# Patient Record
Sex: FEMALE | Race: BLACK OR AFRICAN AMERICAN | NOT HISPANIC OR LATINO
[De-identification: names, ages, dates, MRNs, and addresses within clinical notes are randomized per-mention and may not be internally consistent; named-entity substitution may affect disease eponyms.]

---

## 2024-08-26 ENCOUNTER — APPOINTMENT (OUTPATIENT)
Dept: FAMILY MEDICINE | Facility: CLINIC | Age: 29
End: 2024-08-26
Payer: COMMERCIAL

## 2024-08-26 ENCOUNTER — NON-APPOINTMENT (OUTPATIENT)
Age: 29
End: 2024-08-26

## 2024-08-26 ENCOUNTER — RESULT CHARGE (OUTPATIENT)
Age: 29
End: 2024-08-26

## 2024-08-26 VITALS
HEART RATE: 82 BPM | WEIGHT: 85 LBS | OXYGEN SATURATION: 99 % | BODY MASS INDEX: 16.91 KG/M2 | HEIGHT: 59.5 IN | SYSTOLIC BLOOD PRESSURE: 82 MMHG | DIASTOLIC BLOOD PRESSURE: 58 MMHG | TEMPERATURE: 98.7 F

## 2024-08-26 DIAGNOSIS — Z00.00 ENCOUNTER FOR GENERAL ADULT MEDICAL EXAMINATION W/OUT ABNORMAL FINDINGS: ICD-10-CM

## 2024-08-26 DIAGNOSIS — Z34.90 ENCOUNTER FOR SUPERVISION OF NORMAL PREGNANCY, UNSPECIFIED, UNSPECIFIED TRIMESTER: ICD-10-CM

## 2024-08-26 LAB
BILIRUB UR QL STRIP: NEGATIVE
CLARITY UR: CLEAR
COLLECTION METHOD: NORMAL
GLUCOSE UR-MCNC: NEGATIVE
HCG UR QL: 2 EU/DL
HGB UR QL STRIP.AUTO: NEGATIVE
KETONES UR-MCNC: NORMAL
LEUKOCYTE ESTERASE UR QL STRIP: NEGATIVE
NITRITE UR QL STRIP: NEGATIVE
PH UR STRIP: 6
PROT UR STRIP-MCNC: NEGATIVE
SP GR UR STRIP: 1.02

## 2024-08-26 PROCEDURE — 92552 PURE TONE AUDIOMETRY AIR: CPT

## 2024-08-26 PROCEDURE — 36415 COLL VENOUS BLD VENIPUNCTURE: CPT

## 2024-08-26 PROCEDURE — 99385 PREV VISIT NEW AGE 18-39: CPT

## 2024-08-26 PROCEDURE — 99173 VISUAL ACUITY SCREEN: CPT

## 2024-08-26 PROCEDURE — 81003 URINALYSIS AUTO W/O SCOPE: CPT | Mod: QW

## 2024-08-26 NOTE — HEALTH RISK ASSESSMENT
[1 or 2 (0 pts)] : 1 or 2 (0 points) [Never (0 pts)] : Never (0 points) [No] : In the past 12 months have you used drugs other than those required for medical reasons? No [No falls in past year] : Patient reported no falls in the past year [0] : 2) Feeling down, depressed, or hopeless: Not at all (0) [PHQ-2 Negative - No further assessment needed] : PHQ-2 Negative - No further assessment needed [Patient reported PAP Smear was normal] : Patient reported PAP Smear was normal [HIV Test offered] : HIV Test offered [Hepatitis C test offered] : Hepatitis C test offered [With Family] : lives with family [# of Members in Household ___] :  household currently consist of [unfilled] member(s) [Employed] : employed [High School] : high school [] :  [Sexually Active] : sexually active [Feels Safe at Home] : Feels safe at home [Fully functional (bathing, dressing, toileting, transferring, walking, feeding)] : Fully functional (bathing, dressing, toileting, transferring, walking, feeding) [Fully functional (using the telephone, shopping, preparing meals, housekeeping, doing laundry, using] : Fully functional and needs no help or supervision to perform IADLs (using the telephone, shopping, preparing meals, housekeeping, doing laundry, using transportation, managing medications and managing finances) [Reports normal functional visual acuity (ie: able to read med bottle)] : Reports normal functional visual acuity [Smoke Detector] : smoke detector [Carbon Monoxide Detector] : carbon monoxide detector [Seat Belt] :  uses seat belt [Never] : Never [NO] : No [Audit-CScore] : 0 [[id number]] : 0 [Change in mental status noted] : No change in mental status noted [Language] : denies difficulty with language [Behavior] : denies difficulty with behavior [Learning/Retaining New Information] : denies difficulty learning/retaining new information [Handling Complex Tasks] : denies difficulty handling complex tasks [Reasoning] : denies difficulty with reasoning [Spatial Ability and Orientation] : denies difficulty with spatial ability and orientation [Reports changes in hearing] : Reports no changes in hearing [Reports changes in vision] : Reports no changes in vision [Reports changes in dental health] : Reports no changes in dental health [Sunscreen] : does not use sunscreen [Travel to Developing Areas] : does not  travel to developing areas [TB Exposure] : is not being exposed to tuberculosis [PapSmearDate] : 01/18

## 2024-08-26 NOTE — PHYSICAL EXAM

## 2024-08-26 NOTE — HISTORY OF PRESENT ILLNESS
[FreeTextEntry1] : New pt is here for CPE. at home Pregnancy test positive  08/05/2024  LMP 07/01/2024 [de-identified] : States has been losing weight due to nausea since pregnant  cant cook bc of smells

## 2024-08-28 ENCOUNTER — NON-APPOINTMENT (OUTPATIENT)
Age: 29
End: 2024-08-28

## 2024-08-28 ENCOUNTER — TRANSCRIPTION ENCOUNTER (OUTPATIENT)
Age: 29
End: 2024-08-28

## 2024-08-28 DIAGNOSIS — E05.90 THYROTOXICOSIS, UNSPECIFIED W/OUT THYROTOXIC CRISIS OR STORM: ICD-10-CM

## 2024-08-28 LAB
25(OH)D3 SERPL-MCNC: 89.6 NG/ML
ALBUMIN SERPL ELPH-MCNC: 4.3 G/DL
ALP BLD-CCNC: 50 U/L
ALT SERPL-CCNC: 10 U/L
ANION GAP SERPL CALC-SCNC: 10 MMOL/L
APPEARANCE: CLEAR
AST SERPL-CCNC: 13 U/L
BACTERIA: NEGATIVE /HPF
BILIRUB SERPL-MCNC: 0.5 MG/DL
BILIRUBIN URINE: NEGATIVE
BLOOD URINE: NEGATIVE
BUN SERPL-MCNC: 5 MG/DL
CALCIUM SERPL-MCNC: 10 MG/DL
CAST: 1 /LPF
CHLORIDE SERPL-SCNC: 105 MMOL/L
CHOLEST SERPL-MCNC: 126 MG/DL
CO2 SERPL-SCNC: 22 MMOL/L
COLOR: NORMAL
CREAT SERPL-MCNC: 0.66 MG/DL
EGFR: 121 ML/MIN/1.73M2
EPITHELIAL CELLS: 8 /HPF
ESTIMATED AVERAGE GLUCOSE: 91 MG/DL
GLUCOSE QUALITATIVE U: NEGATIVE MG/DL
GLUCOSE SERPL-MCNC: 90 MG/DL
HBA1C MFR BLD HPLC: 4.8 %
HCG SERPL-MCNC: ABNORMAL MIU/ML
HCT VFR BLD CALC: 37.9 %
HCV AB SER QL: NONREACTIVE
HCV S/CO RATIO: 0.06 S/CO
HDLC SERPL-MCNC: 63 MG/DL
HGB BLD-MCNC: 12.8 G/DL
HIV1+2 AB SPEC QL IA.RAPID: NONREACTIVE
KETONES URINE: 40 MG/DL
LDLC SERPL CALC-MCNC: 50 MG/DL
LEUKOCYTE ESTERASE URINE: NEGATIVE
MCHC RBC-ENTMCNC: 29.1 PG
MCHC RBC-ENTMCNC: 33.8 GM/DL
MCV RBC AUTO: 86.1 FL
MICROSCOPIC-UA: NORMAL
MUCUS: PRESENT
NITRITE URINE: NEGATIVE
NONHDLC SERPL-MCNC: 63 MG/DL
PH URINE: 6
PLATELET # BLD AUTO: 257 K/UL
POTASSIUM SERPL-SCNC: 4.1 MMOL/L
PROT SERPL-MCNC: 6.7 G/DL
PROTEIN URINE: NORMAL MG/DL
RBC # BLD: 4.4 M/UL
RBC # FLD: 12.4 %
RED BLOOD CELLS URINE: 2 /HPF
REVIEW: NORMAL
SODIUM SERPL-SCNC: 138 MMOL/L
SPECIFIC GRAVITY URINE: 1.02
T3FREE SERPL-MCNC: 5.29 PG/ML
T4 FREE SERPL-MCNC: 2.3 NG/DL
TRIGL SERPL-MCNC: 62 MG/DL
TSH SERPL-ACNC: 0.01 UIU/ML
UROBILINOGEN URINE: 1 MG/DL
WBC # FLD AUTO: 5.33 K/UL
WHITE BLOOD CELLS URINE: 0 /HPF

## 2024-09-04 ENCOUNTER — APPOINTMENT (OUTPATIENT)
Dept: MATERNAL FETAL MEDICINE | Facility: CLINIC | Age: 29
End: 2024-09-04

## 2024-09-05 ENCOUNTER — APPOINTMENT (OUTPATIENT)
Dept: MATERNAL FETAL MEDICINE | Facility: CLINIC | Age: 29
End: 2024-09-05
Payer: COMMERCIAL

## 2024-09-05 ENCOUNTER — APPOINTMENT (OUTPATIENT)
Dept: ANTEPARTUM | Facility: CLINIC | Age: 29
End: 2024-09-05
Payer: COMMERCIAL

## 2024-09-05 ENCOUNTER — ASOB RESULT (OUTPATIENT)
Age: 29
End: 2024-09-05

## 2024-09-05 VITALS
SYSTOLIC BLOOD PRESSURE: 104 MMHG | HEIGHT: 59 IN | RESPIRATION RATE: 18 BRPM | DIASTOLIC BLOOD PRESSURE: 60 MMHG | OXYGEN SATURATION: 99 % | HEART RATE: 89 BPM | BODY MASS INDEX: 17.14 KG/M2 | WEIGHT: 85 LBS

## 2024-09-05 DIAGNOSIS — Z78.9 OTHER SPECIFIED HEALTH STATUS: ICD-10-CM

## 2024-09-05 DIAGNOSIS — Z3A.09 9 WEEKS GESTATION OF PREGNANCY: ICD-10-CM

## 2024-09-05 DIAGNOSIS — E05.90 ENDOCRINE, NUTRITIONAL AND METABOLIC DISEASES COMPLICATING PREGNANCY, UNSPECIFIED TRIMESTER: ICD-10-CM

## 2024-09-05 DIAGNOSIS — O99.280 ENDOCRINE, NUTRITIONAL AND METABOLIC DISEASES COMPLICATING PREGNANCY, UNSPECIFIED TRIMESTER: ICD-10-CM

## 2024-09-05 PROCEDURE — 76801 OB US < 14 WKS SINGLE FETUS: CPT

## 2024-09-05 PROCEDURE — 99204 OFFICE O/P NEW MOD 45 MIN: CPT

## 2024-09-05 RX ORDER — PNV NO.95/FERROUS FUM/FOLIC AC 28MG-0.8MG
TABLET ORAL
Refills: 0 | Status: ACTIVE | COMMUNITY

## 2024-09-05 NOTE — DATA REVIEWED
[FreeTextEntry1] : Fetal viability was confirmed.   We reviewed her TSH and free tT3/T4.  A consultation with endocrinology was seen last week, and a followup labs and evaluation for medical therapy was held until that meeting in 2 weeks. At this time, we will defer for that plan  I did discuss the symptoms associated with hyperthyroid, and given her lack of goiter or palpable nodules, the liklihood for a successful correction with medication. Her main concern is weight loss, especially since she is underweight to start. I stressed the importance of hydration, and what seems to be her symptoims more consistent with fist trimester hyperemesis rather than hyperthyroidism weight loss.   We will continue to monitor as she leaves the first trimester.

## 2024-09-05 NOTE — DISCUSSION/SUMMARY
[FreeTextEntry1] : Followup with endocrinology is scheduled.   Repeat consultation as clinically indicated.

## 2024-09-09 ENCOUNTER — ASOB RESULT (OUTPATIENT)
Age: 29
End: 2024-09-09

## 2024-09-09 ENCOUNTER — APPOINTMENT (OUTPATIENT)
Dept: MATERNAL FETAL MEDICINE | Facility: CLINIC | Age: 29
End: 2024-09-09
Payer: COMMERCIAL

## 2024-09-09 PROCEDURE — 99212 OFFICE O/P EST SF 10 MIN: CPT | Mod: 95

## 2024-09-23 ENCOUNTER — LABORATORY RESULT (OUTPATIENT)
Age: 29
End: 2024-09-23

## 2024-09-24 ENCOUNTER — ASOB RESULT (OUTPATIENT)
Age: 29
End: 2024-09-24

## 2024-09-24 ENCOUNTER — APPOINTMENT (OUTPATIENT)
Dept: ANTEPARTUM | Facility: CLINIC | Age: 29
End: 2024-09-24
Payer: COMMERCIAL

## 2024-09-24 PROCEDURE — 36415 COLL VENOUS BLD VENIPUNCTURE: CPT

## 2024-09-24 PROCEDURE — 76813 OB US NUCHAL MEAS 1 GEST: CPT

## 2024-10-01 ENCOUNTER — NON-APPOINTMENT (OUTPATIENT)
Age: 29
End: 2024-10-01

## 2024-10-04 LAB
ADDITIONAL US: NORMAL
COMMENTS: AFP: NORMAL
CRL SCAN TWIN B: NORMAL
CRL SCAN: NORMAL
CROWN RUMP LENGTH TWIN B: NORMAL
CROWN RUMP LENGTH: 60.1 MM
DOWN SYNDROME AGE RISK: NORMAL
DOWN SYNDROME INTERPRETATION: NORMAL
DOWN SYNDROME SCREENING RISK: NORMAL
GEST. AGE ON COLLECTION DATE: 12.3 WEEKS
HCG MOM: 1.04
HCG VALUE: 154.7 IU/ML
MATERNAL AGE AT EDD: 30.9 YR
NOTE: AFP: NORMAL
NT MOM TWIN B: NORMAL
NT TWIN B: NORMAL
NUCHAL TRANSLUCENCY (NT): 1.4 MM
NUCHAL TRANSLUCENCY MOM: 0.9
NUMBER OF FETUSES: 1
PAPP-A MOM: 1.69
PAPP-A VALUE: 3037.7 NG/ML
RACE: NORMAL
RESULTS AFP: NORMAL
SONOGRAPHER ID#: NORMAL
SUBMIT PART 2 SAMPLE USING: NORMAL
TEST RESULTS: AFP: NORMAL
TRISOMY 18 AGE RISK: NORMAL
TRISOMY 18 INTERPRETATION: NORMAL
TRISOMY 18 SCREENING RISK: NORMAL
WEIGHT AFP: 83 LBS

## 2024-10-11 ENCOUNTER — EMERGENCY (EMERGENCY)
Facility: HOSPITAL | Age: 29
LOS: 0 days | Discharge: ANOTHER HOSPITAL | End: 2024-10-11
Attending: EMERGENCY MEDICINE
Payer: COMMERCIAL

## 2024-10-11 ENCOUNTER — EMERGENCY (EMERGENCY)
Facility: HOSPITAL | Age: 29
LOS: 1 days | Discharge: HOME | End: 2024-10-11
Attending: STUDENT IN AN ORGANIZED HEALTH CARE EDUCATION/TRAINING PROGRAM
Payer: COMMERCIAL

## 2024-10-11 ENCOUNTER — APPOINTMENT (OUTPATIENT)
Dept: FAMILY MEDICINE | Facility: CLINIC | Age: 29
End: 2024-10-11

## 2024-10-11 VITALS — WEIGHT: 83.33 LBS | HEIGHT: 60 IN

## 2024-10-11 VITALS
OXYGEN SATURATION: 95 % | RESPIRATION RATE: 16 BRPM | DIASTOLIC BLOOD PRESSURE: 57 MMHG | TEMPERATURE: 100 F | SYSTOLIC BLOOD PRESSURE: 94 MMHG | HEIGHT: 60 IN | WEIGHT: 115.08 LBS | HEART RATE: 109 BPM

## 2024-10-11 VITALS
OXYGEN SATURATION: 100 % | DIASTOLIC BLOOD PRESSURE: 66 MMHG | HEART RATE: 105 BPM | RESPIRATION RATE: 18 BRPM | SYSTOLIC BLOOD PRESSURE: 97 MMHG | TEMPERATURE: 99 F

## 2024-10-11 DIAGNOSIS — J11.1 INFLUENZA DUE TO UNIDENTIFIED INFLUENZA VIRUS WITH OTHER RESPIRATORY MANIFESTATIONS: ICD-10-CM

## 2024-10-11 DIAGNOSIS — E05.90 THYROTOXICOSIS, UNSPECIFIED WITHOUT THYROTOXIC CRISIS OR STORM: ICD-10-CM

## 2024-10-11 DIAGNOSIS — R09.81 NASAL CONGESTION: ICD-10-CM

## 2024-10-11 DIAGNOSIS — R00.0 TACHYCARDIA, UNSPECIFIED: ICD-10-CM

## 2024-10-11 DIAGNOSIS — Z3A.14 14 WEEKS GESTATION OF PREGNANCY: ICD-10-CM

## 2024-10-11 DIAGNOSIS — R05.9 COUGH, UNSPECIFIED: ICD-10-CM

## 2024-10-11 DIAGNOSIS — O99.891 OTHER SPECIFIED DISEASES AND CONDITIONS COMPLICATING PREGNANCY: ICD-10-CM

## 2024-10-11 DIAGNOSIS — Z00.00 ENCOUNTER FOR GENERAL ADULT MEDICAL EXAMINATION WITHOUT ABNORMAL FINDINGS: ICD-10-CM

## 2024-10-11 DIAGNOSIS — O99.282 ENDOCRINE, NUTRITIONAL AND METABOLIC DISEASES COMPLICATING PREGNANCY, SECOND TRIMESTER: ICD-10-CM

## 2024-10-11 DIAGNOSIS — J02.9 ACUTE PHARYNGITIS, UNSPECIFIED: ICD-10-CM

## 2024-10-11 LAB
ADD ON TEST-SPECIMEN IN LAB: SIGNIFICANT CHANGE UP
ALBUMIN SERPL ELPH-MCNC: 3.1 G/DL — LOW (ref 3.3–5)
ALP SERPL-CCNC: 57 U/L — SIGNIFICANT CHANGE UP (ref 40–120)
ALT FLD-CCNC: 12 U/L — SIGNIFICANT CHANGE UP (ref 12–78)
ANION GAP SERPL CALC-SCNC: 8 MMOL/L — SIGNIFICANT CHANGE UP (ref 5–17)
APPEARANCE UR: CLEAR — SIGNIFICANT CHANGE UP
APTT BLD: 30.5 SEC — SIGNIFICANT CHANGE UP (ref 24.5–35.6)
AST SERPL-CCNC: 16 U/L — SIGNIFICANT CHANGE UP (ref 15–37)
BASOPHILS # BLD AUTO: 0.02 K/UL — SIGNIFICANT CHANGE UP (ref 0–0.2)
BASOPHILS NFR BLD AUTO: 0.2 % — SIGNIFICANT CHANGE UP (ref 0–2)
BILIRUB SERPL-MCNC: 0.6 MG/DL — SIGNIFICANT CHANGE UP (ref 0.2–1.2)
BILIRUB UR-MCNC: NEGATIVE — SIGNIFICANT CHANGE UP
BUN SERPL-MCNC: 2 MG/DL — LOW (ref 7–23)
CALCIUM SERPL-MCNC: 9.5 MG/DL — SIGNIFICANT CHANGE UP (ref 8.5–10.1)
CHLORIDE SERPL-SCNC: 106 MMOL/L — SIGNIFICANT CHANGE UP (ref 96–108)
CO2 SERPL-SCNC: 22 MMOL/L — SIGNIFICANT CHANGE UP (ref 22–31)
COLOR SPEC: YELLOW — SIGNIFICANT CHANGE UP
CREAT SERPL-MCNC: 0.65 MG/DL — SIGNIFICANT CHANGE UP (ref 0.5–1.3)
D DIMER BLD IA.RAPID-MCNC: 173 NG/ML DDU — SIGNIFICANT CHANGE UP
DIFF PNL FLD: NEGATIVE — SIGNIFICANT CHANGE UP
EGFR: 121 ML/MIN/1.73M2 — SIGNIFICANT CHANGE UP
EOSINOPHIL # BLD AUTO: 0.01 K/UL — SIGNIFICANT CHANGE UP (ref 0–0.5)
EOSINOPHIL NFR BLD AUTO: 0.1 % — SIGNIFICANT CHANGE UP (ref 0–6)
FLUAV AG NPH QL: SIGNIFICANT CHANGE UP
FLUBV AG NPH QL: SIGNIFICANT CHANGE UP
GLUCOSE SERPL-MCNC: 105 MG/DL — HIGH (ref 70–99)
GLUCOSE UR QL: NEGATIVE MG/DL — SIGNIFICANT CHANGE UP
HCG SERPL-ACNC: HIGH MIU/ML
HCT VFR BLD CALC: 36.4 % — SIGNIFICANT CHANGE UP (ref 34.5–45)
HGB BLD-MCNC: 12.2 G/DL — SIGNIFICANT CHANGE UP (ref 11.5–15.5)
IMM GRANULOCYTES NFR BLD AUTO: 0.7 % — SIGNIFICANT CHANGE UP (ref 0–0.9)
INR BLD: 0.93 RATIO — SIGNIFICANT CHANGE UP (ref 0.85–1.16)
KETONES UR-MCNC: 80 MG/DL
LACTATE SERPL-SCNC: 1.2 MMOL/L — SIGNIFICANT CHANGE UP (ref 0.7–2)
LEUKOCYTE ESTERASE UR-ACNC: NEGATIVE — SIGNIFICANT CHANGE UP
LYMPHOCYTES # BLD AUTO: 0.32 K/UL — LOW (ref 1–3.3)
LYMPHOCYTES # BLD AUTO: 3.6 % — LOW (ref 13–44)
MCHC RBC-ENTMCNC: 28.9 PG — SIGNIFICANT CHANGE UP (ref 27–34)
MCHC RBC-ENTMCNC: 33.5 GM/DL — SIGNIFICANT CHANGE UP (ref 32–36)
MCV RBC AUTO: 86.3 FL — SIGNIFICANT CHANGE UP (ref 80–100)
MONOCYTES # BLD AUTO: 0.62 K/UL — SIGNIFICANT CHANGE UP (ref 0–0.9)
MONOCYTES NFR BLD AUTO: 7 % — SIGNIFICANT CHANGE UP (ref 2–14)
NEUTROPHILS # BLD AUTO: 7.89 K/UL — HIGH (ref 1.8–7.4)
NEUTROPHILS NFR BLD AUTO: 88.4 % — HIGH (ref 43–77)
NITRITE UR-MCNC: NEGATIVE — SIGNIFICANT CHANGE UP
PH UR: 6 — SIGNIFICANT CHANGE UP (ref 5–8)
PLATELET # BLD AUTO: 245 K/UL — SIGNIFICANT CHANGE UP (ref 150–400)
POTASSIUM SERPL-MCNC: 3.6 MMOL/L — SIGNIFICANT CHANGE UP (ref 3.5–5.3)
POTASSIUM SERPL-SCNC: 3.6 MMOL/L — SIGNIFICANT CHANGE UP (ref 3.5–5.3)
PROT SERPL-MCNC: 7 GM/DL — SIGNIFICANT CHANGE UP (ref 6–8.3)
PROT UR-MCNC: NEGATIVE MG/DL — SIGNIFICANT CHANGE UP
PROTHROM AB SERPL-ACNC: 11 SEC — SIGNIFICANT CHANGE UP (ref 9.9–13.4)
RBC # BLD: 4.22 M/UL — SIGNIFICANT CHANGE UP (ref 3.8–5.2)
RBC # FLD: 12.5 % — SIGNIFICANT CHANGE UP (ref 10.3–14.5)
RSV RNA NPH QL NAA+NON-PROBE: SIGNIFICANT CHANGE UP
SARS-COV-2 RNA SPEC QL NAA+PROBE: SIGNIFICANT CHANGE UP
SODIUM SERPL-SCNC: 136 MMOL/L — SIGNIFICANT CHANGE UP (ref 135–145)
SP GR SPEC: 1.01 — SIGNIFICANT CHANGE UP (ref 1–1.03)
T3 SERPL-MCNC: 124 NG/DL — SIGNIFICANT CHANGE UP (ref 80–200)
T3 SERPL-MCNC: 137 NG/DL — SIGNIFICANT CHANGE UP (ref 80–200)
T4 AB SER-ACNC: 10.7 UG/DL — SIGNIFICANT CHANGE UP (ref 4.6–12)
T4 FREE SERPL-MCNC: 1.08 NG/DL — SIGNIFICANT CHANGE UP (ref 0.76–1.46)
TROPONIN I, HIGH SENSITIVITY RESULT: 3.96 NG/L — SIGNIFICANT CHANGE UP
TSH SERPL-MCNC: 0.02 UU/ML — LOW (ref 0.34–4.82)
TSH SERPL-MCNC: 0.02 UU/ML — LOW (ref 0.34–4.82)
UROBILINOGEN FLD QL: 0.2 MG/DL — SIGNIFICANT CHANGE UP (ref 0.2–1)
WBC # BLD: 8.92 K/UL — SIGNIFICANT CHANGE UP (ref 3.8–10.5)
WBC # FLD AUTO: 8.92 K/UL — SIGNIFICANT CHANGE UP (ref 3.8–10.5)

## 2024-10-11 PROCEDURE — 85730 THROMBOPLASTIN TIME PARTIAL: CPT

## 2024-10-11 PROCEDURE — 85379 FIBRIN DEGRADATION QUANT: CPT

## 2024-10-11 PROCEDURE — 81003 URINALYSIS AUTO W/O SCOPE: CPT

## 2024-10-11 PROCEDURE — 99285 EMERGENCY DEPT VISIT HI MDM: CPT | Mod: 25

## 2024-10-11 PROCEDURE — 84436 ASSAY OF TOTAL THYROXINE: CPT

## 2024-10-11 PROCEDURE — 87040 BLOOD CULTURE FOR BACTERIA: CPT

## 2024-10-11 PROCEDURE — 76815 OB US LIMITED FETUS(S): CPT | Mod: 26

## 2024-10-11 PROCEDURE — 84484 ASSAY OF TROPONIN QUANT: CPT

## 2024-10-11 PROCEDURE — 85025 COMPLETE CBC W/AUTO DIFF WBC: CPT

## 2024-10-11 PROCEDURE — 36000 PLACE NEEDLE IN VEIN: CPT

## 2024-10-11 PROCEDURE — 99215 OFFICE O/P EST HI 40 MIN: CPT | Mod: 95

## 2024-10-11 PROCEDURE — 93010 ELECTROCARDIOGRAM REPORT: CPT

## 2024-10-11 PROCEDURE — 71045 X-RAY EXAM CHEST 1 VIEW: CPT

## 2024-10-11 PROCEDURE — 71045 X-RAY EXAM CHEST 1 VIEW: CPT | Mod: 26

## 2024-10-11 PROCEDURE — 83605 ASSAY OF LACTIC ACID: CPT

## 2024-10-11 PROCEDURE — G2211 COMPLEX E/M VISIT ADD ON: CPT

## 2024-10-11 PROCEDURE — 76815 OB US LIMITED FETUS(S): CPT

## 2024-10-11 PROCEDURE — 0241U: CPT

## 2024-10-11 PROCEDURE — 93005 ELECTROCARDIOGRAM TRACING: CPT

## 2024-10-11 PROCEDURE — 36415 COLL VENOUS BLD VENIPUNCTURE: CPT

## 2024-10-11 PROCEDURE — 84480 ASSAY TRIIODOTHYRONINE (T3): CPT

## 2024-10-11 PROCEDURE — 85610 PROTHROMBIN TIME: CPT

## 2024-10-11 PROCEDURE — 99283 EMERGENCY DEPT VISIT LOW MDM: CPT

## 2024-10-11 PROCEDURE — 99285 EMERGENCY DEPT VISIT HI MDM: CPT

## 2024-10-11 PROCEDURE — 80053 COMPREHEN METABOLIC PANEL: CPT

## 2024-10-11 PROCEDURE — 84702 CHORIONIC GONADOTROPIN TEST: CPT

## 2024-10-11 PROCEDURE — 84443 ASSAY THYROID STIM HORMONE: CPT

## 2024-10-11 PROCEDURE — 84439 ASSAY OF FREE THYROXINE: CPT

## 2024-10-11 RX ORDER — SODIUM CHLORIDE 0.9 % (FLUSH) 0.9 %
1000 SYRINGE (ML) INJECTION ONCE
Refills: 0 | Status: COMPLETED | OUTPATIENT
Start: 2024-10-11 | End: 2024-10-11

## 2024-10-11 RX ORDER — ACETAMINOPHEN 325 MG
650 TABLET ORAL ONCE
Refills: 0 | Status: COMPLETED | OUTPATIENT
Start: 2024-10-11 | End: 2024-10-11

## 2024-10-11 RX ORDER — SODIUM CHLORIDE 0.9 % (FLUSH) 0.9 %
1150 SYRINGE (ML) INJECTION ONCE
Refills: 0 | Status: COMPLETED | OUTPATIENT
Start: 2024-10-11 | End: 2024-10-11

## 2024-10-11 RX ADMIN — Medication 1000 MILLILITER(S): at 23:40

## 2024-10-11 RX ADMIN — Medication 650 MILLIGRAM(S): at 23:41

## 2024-10-11 RX ADMIN — Medication 1150 MILLILITER(S): at 11:38

## 2024-10-11 RX ADMIN — Medication 1000 MILLILITER(S): at 12:19

## 2024-10-11 NOTE — ED PROVIDER NOTE - PROGRESS NOTE DETAILS
Consulted with endocrinologist Dr. Long on the phone. Reviewed labwork, hold methimazole. Propanolol prn for symptoms of heart racing/ chest pain. Endocrine will see pt in the AM.

## 2024-10-11 NOTE — ED PROVIDER NOTE - CLINICAL SUMMARY MEDICAL DECISION MAKING FREE TEXT BOX
Pt is a 29y  at 14w4d by LMP who presented to the ED for elevated heartrate and chest tightness at home. Pt was diagnosed with hyperthyroidism during this pregnancy in end of august and has been taking 5mg methimazole TID, despite endocrinologist wanting 10mg TID in their last visit due to a miscommunication with their office's NP who prescribed 5mg TID. Pt has taken 15mg methimazole total today and propanolol at 3pm. Pt denies N/V/D, abdominal pain, SOB. Pt feels better after being given meds at 3pm. Pt was transferred due to no endocrinologist at [place]. Pt is flu positive at [facility] today.     Consulted with endocrinologist. Pt is a 29y  at 14w4d by LMP who presented to the ED for elevated heartrate and chest tightness at home. Pt was diagnosed with hyperthyroidism during this pregnancy in end of august and has been taking 5mg methimazole TID, despite endocrinologist wanting 10mg TID in their last visit due to a miscommunication with their office's NP who prescribed 5mg TID. Pt has taken 15mg methimazole total today and propanolol at 3pm. Pt denies N/V/D, abdominal pain, SOB. Pt feels better after being given meds at 3pm. Pt was transferred due to no endocrinologist at [place]. Pt is flu positive at [place] today.     Consulted with endocrinologist Dr. Long. Pt is a 29y  at 14w4d by LMP who presented to the ED for elevated heartrate and chest tightness at home. Pt was diagnosed with hyperthyroidism during this pregnancy in end of august and has been taking 5mg methimazole TID, despite endocrinologist wanting 10mg TID in their last visit due to a miscommunication with their office's NP who prescribed 5mg TID. Pt has taken 15mg methimazole total today and propanolol at 3pm. Pt denies N/V/D, abdominal pain, SOB. Pt feels better after being given meds at 3pm. Pt was transferred due to no endocrinologist at [place].     Consulted with endocrinologist Dr. Long. Pt is a 29y  at 14w4d by LMP who presented to the ED for elevated heartrate and chest tightness at home. Pt was diagnosed with hyperthyroidism during this pregnancy in end of august and has been taking 5mg methimazole TID, despite endocrinologist wanting 10mg TID in their last visit due to a miscommunication with their office's NP who prescribed 5mg TID. Pt has taken 15mg methimazole total today and propanolol at 3pm. Pt denies N/V/D, abdominal pain, SOB. Pt feels better after being given meds at 3pm. Pt was transferred due to no endocrinologist at [facility].     Consulted with endocrinologist Dr. Long. Reviewed labwork, hold methimazole. Propanolol prn for symptoms of heart racing/ chest pain. Endocrine will see pt in the AM.  Pt tested positive for enterovirus. Given 2L NS total and tylenol. Placed in obs for endocrine to see in the morning.

## 2024-10-11 NOTE — ED PROVIDER NOTE - ATTENDING CONTRIBUTION TO CARE
29-year-old G1, P0 at 14 weeks presented presents to the ER as a transfer from [facility] for endocrine consult.  Patient was feeling palpitations and chest tightness at home.  She was diagnosed hyperthyroidism during pregnancy and has been taking methimazole 5 mg 3 times daily.  Went to [facility] today and was given 10 mg of methimazole and a dose of propranolol (unclear how much).  Patient was transferred here for endocrine consult.  Patient states symptoms feel better after being given medications today.  Patient reports being flu positive at [facility] but per chart review, flu COVID and RSV was negative.  Patient endorses nasal congestion no cough.  Labs reviewed and nonactionable, UA was negative D-dimer was negative.  On exam patient is well-appearing, heart rate mildly tachycardic but no appreciable murmurs rubs or gallops, lungs clear to auscultation throughout, abdomen soft and nontender.  Patient seen and evaluated by OB/GYN who had a positive fetal heart rate and recommending continuation of the methimazole and rec states that propranolol and Zofran is safe as well.  Will check labs give fluids and some Tylenol as patient has low-grade fever and mild tachycardia on arrival.  Possibly in the setting of hyperthyroidism versus viral syndrome.  Will likely place in observation for endocrine consult in the morning.  MD boyer spoke with endocrinologist on-call as noted in progress note above who is recommending holding methimazole and using propranolol as needed for symptom control.  Endocrine will evaluate in the morning.

## 2024-10-11 NOTE — CONSULT NOTE ADULT - ATTENDING COMMENTS
Pt seen and examined.  Agree with note above.  Pt to be transferred to [place] for endocrine management.  OB team at  made aware.    MAX

## 2024-10-11 NOTE — CONSULT NOTE ADULT - SUBJECTIVE AND OBJECTIVE BOX
AVERY GARCÍA is a 29y  at 14w4d by LMP who presented to the ED for elevated heartrate at home. GYN consulted for management of hyperthyroid in early pregnancy. Patient was recently diagnosed wih hyperthyroid during this pregnancy for which she was started on methimazole 5mg TID. Patient has been following with endocrinology for this issue. Patient recently had some flu like symptoms complaining of malaise and fevers and when she checked her HR was in 130s she called her doctor and was told to get evaluated in ED. Upon arrival to ED patient was tachycardic and TSH was 0.02. Patient otherwise stable. Denies HA, N/V, fevers, chills, abdominal pain and is alert and oriented. Denies vaginal bleeding, discharge or cramping.    Denies HA, lightheadedness, dizziness, visual disturbances, SOB, CP, nausea and vomiting.     POBHx: G1  PGYNHx: Denies  PMHx: Hyperthyroidism  Meds: methimazole   All: NKDA  PSHx: Wrist surgery  Social Hx: Denies    Physical Exam  T(C): 38.1 (10-11-24 @ 14:45), Max: 38.1 (10-11-24 @ 14:45)  HR: 119 (10-11-24 @ 15:35) (119 - 124)  BP: 105/69 (10-11-24 @ 15:35) (105/69 - 117/74)  RR: 19 (10-11-24 @ 12:38) (19 - 20)  SpO2: 100% (10-11-24 @ 12:38) (99% - 100%)  Gen: AAOx3, NAD  Abd:Nontender, nondistended.  No guarding, no rebound tenderness. No CVA tenderness  Ext: No swelling, redness or tenderness in the UE or LE b/l.     Labs:                        12.2   8.92  )-----------( 245      ( 11 Oct 2024 11:25 )             36.4     10-11    136  |  106  |  2[L]  ----------------------------<  105[H]  3.6   |  22  |  0.65    Ca    9.5      11 Oct 2024 11:25    TPro  7.0  /  Alb  3.1[L]  /  TBili  0.6  /  DBili  x   /  AST  16  /  ALT  12  /  AlkPhos  57  10-11    PT/INR - ( 11 Oct 2024 11:25 )   PT: 11.0 sec;   INR: 0.93 ratio         PTT - ( 11 Oct 2024 11:25 )  PTT:30.5 sec  HCG Quantitative, Serum: 91897 mIU/mL (10-11-24 @ 11:25)        Imaging:     TVUS shows FH of 170s         AVERY GARCÍA is a 29y  at 14w4d by LMP who presented to the ED for elevated heartrate at home. GYN consulted for management of hyperthyroid in early pregnancy. Patient was recently diagnosed with hyperthyroid during this pregnancy for which she was started on methimazole 5mg TID. Patient has been following with endocrinology for this issue. Patient recently had some flu like symptoms complaining of malaise and fevers and when she checked her HR was in 130s she called her doctor and was told to get evaluated in ED. Upon arrival to ED patient was tachycardic and TSH was 0.02. Patient otherwise stable. Denies HA, N/V, fevers, chills, abdominal pain and is alert and oriented. Denies vaginal bleeding, discharge or cramping.    Denies HA, lightheadedness, dizziness, visual disturbances, SOB, CP, nausea and vomiting.     POBHx: G1  PGYNHx: Denies  PMHx: Hyperthyroidism  Meds: methimazole   All: NKDA  PSHx: Wrist surgery  Social Hx: Denies    Physical Exam  T(C): 38.1 (10-11-24 @ 14:45), Max: 38.1 (10-11-24 @ 14:45)  HR: 119 (10-11-24 @ 15:35) (119 - 124)  BP: 105/69 (10-11-24 @ 15:35) (105/69 - 117/74)  RR: 19 (10-11-24 @ 12:38) (19 - 20)  SpO2: 100% (10-11-24 @ 12:38) (99% - 100%)  Gen: AAOx3, NAD  Abd:Nontender, nondistended.  No guarding, no rebound tenderness. No CVA tenderness  Ext: No swelling, redness or tenderness in the UE or LE b/l.     Labs:                        12.2   8.92  )-----------( 245      ( 11 Oct 2024 11:25 )             36.4     10-11    136  |  106  |  2[L]  ----------------------------<  105[H]  3.6   |  22  |  0.65    Ca    9.5      11 Oct 2024 11:25    TPro  7.0  /  Alb  3.1[L]  /  TBili  0.6  /  DBili  x   /  AST  16  /  ALT  12  /  AlkPhos  57  10-11    PT/INR - ( 11 Oct 2024 11:25 )   PT: 11.0 sec;   INR: 0.93 ratio         PTT - ( 11 Oct 2024 11:25 )  PTT:30.5 sec  HCG Quantitative, Serum: 01976 mIU/mL (10-11-24 @ 11:25)        Imaging:     TVUS shows FH of 170s

## 2024-10-11 NOTE — CONSULT NOTE ADULT - NSCONSULTADDITIONALINFOA_GEN_ALL_CORE
MFM Fellow Addendum    28yo  at 14w4d weeks gestation who was transferred from  for endocrine management in the setting of known hyperthyroidism with concerns for thyroid storm. Labs consistent with thyroxemia with low TSH and normal T3, patient assymptomatic at time of evaluation. Management per primary team and endocrinology.     Medication exposure in pregnancy. While methimazole is preferred at this point in pregnancy, life saving measures should not be withheld from a patient because of concern for exposure to medications. If PTU is required as part of her care it is reasonable to administer. Propranolol similarly can be continued.     Continue prenatal vitamins while admitted. FH check on discharge.     Discussed with MFM Attending Dr Gil.     Steve Fernandes DO   MFM Fellow

## 2024-10-11 NOTE — ED ADULT NURSE NOTE - OBJECTIVE STATEMENT
pt presenting to the ed C/O flu like symptoms. pt is currently 14 wks pregnant and was told to come to the ER by her PCP for elevated heart rate. Pt endorsing 2 episodes of vomiting today secondary to body aches, chills and nasal congestion. pt denying chest pain/SOB.

## 2024-10-11 NOTE — ED ADULT TRIAGE NOTE - CHIEF COMPLAINT QUOTE
pt complaining of shortness of breath, elevated hr (135), and flu-like symptoms.  pt is currently 14 weeks pregnant.  denies fevers. pt complaining of shortness of breath, elevated hr (135), and flu-like symptoms.  pt is currently 14 weeks pregnant.  SPO2 95% RA, denies fevers.

## 2024-10-11 NOTE — CONSULT NOTE ADULT - SUBJECTIVE AND OBJECTIVE BOX
28yo  at 14w4d weeks gestation who presented as a transfer from  with concerns for thyroid storm based on symptoms including palpitations, HA, rhinorrhea and sneezing, -115. Patient with known history of hyperthyroidism diagnosed in 2024, currently on methimazole 5mg TID since 24. Patient follows with outpatient endocrinology, [facility] and [facility].    MARTA 25  LMP 24    Pregnancy course complicated by:  1. Hyperthyroisism  2. Underweight (BMI 16.5)    OBHx: denies  GYNHx: denies hx of ovarian cysts, fibroids, STIs and abnormal paps  PMH: hyperthyroidism  SurgHx: denies  Meds: PNV,  methimazole 5mg TID  All: NKDA  SocialHx: denies EtOH, tobacco and illicit drug use in pregnancy 30yo  at 14w4d weeks gestation who was transfered from  for endocrine management, initially presented to  ED for palpitations and chest tightness at home. Patient with known history of hyperthyroidism diagnosed in 2024, currently on methimazole 5mg TID since 24. Due to a miscommunication in outpatient endocrinology office patient had been taking 5mg TID rather than 10mg TID of methimazole. Patient received 15mg methimazole today and propanlol; states feeling better since receiving medications around 3PM today.     Patient follows with outpatient endocrinology, [facility] and [facility].    MARTA 25  LMP 24    Pregnancy course complicated by:  1. Hyperthyroisism  2. Underweight (BMI 16.5)    OBHx: denies  GYNHx: denies hx of ovarian cysts, fibroids, STIs and abnormal paps  PMH: hyperthyroidism  SurgHx: denies  Meds: PNV,  methimazole 5mg TID  All: NKDA  SocialHx: denies EtOH, tobacco and illicit drug use in pregnancy    Vital Signs Last 24 Hrs  T(C): 37.6 (11 Oct 2024 18:11), Max: 37.6 (11 Oct 2024 18:11)  T(F): 99.7 (11 Oct 2024 18:11), Max: 99.7 (11 Oct 2024 18:11)  HR: 109 (11 Oct 2024 18:11) (109 - 109)  BP: 94/57 (11 Oct 2024 18:11) (94/57 - 94/57)  RR: 16 (11 Oct 2024 18:11) (16 - 16)  SpO2: 95% (11 Oct 2024 18:11) (95% - 95%)    Parameters below as of 11 Oct 2024 18:11  Patient On (Oxygen Delivery Method): room air     28yo  at 14w4d weeks gestation who was transferred from  for endocrine management, initially presented to  ED for palpitations and elevated HR at home. Patient states she began to have flu-like symptoms Wednesday PM including HA, runny nose, sneezing; was told by PCP to monitor O2 sat and HR. At her telehelath appointment today, PCP noted home HR monitoring to be 120-150s and patient was told to come to the ED. Patient was found to be flu positive at . Patient with known history of hyperthyroidism diagnosed in 2024, currently on methimazole 5mg TID since 24. Due to a miscommunication in outpatient endocrinology office patient had been taking 5mg TID rather than 10mg TID of methimazole. Patient took 5mg methimazole this AM, was given 10mg methimazole at  and propanlol this afternoon; states feeling better since receiving medications around 3PM today.     Patient follows with outpatient endocrinology, [facility] and [facility].    MARTA 25  LMP 24    Pregnancy course complicated by:  1. Hyperthyroisism  2. Underweight (BMI 16.5)  3. Flu positive    OBHx: denies  GYNHx: denies hx of ovarian cysts, fibroids, STIs and abnormal paps  PMH: hyperthyroidism  SurgHx: denies  Meds: PNV,  methimazole 5mg TID  All: NKDA  SocialHx: denies EtOH, tobacco and illicit drug use in pregnancy    Vital Signs Last 24 Hrs  T(C): 37.6 (11 Oct 2024 18:11), Max: 37.6 (11 Oct 2024 18:11)  T(F): 99.7 (11 Oct 2024 18:11), Max: 99.7 (11 Oct 2024 18:11)  HR: 109 (11 Oct 2024 18:11) (109 - 109)  BP: 94/57 (11 Oct 2024 18:11) (94/57 - 94/57)  RR: 16 (11 Oct 2024 18:11) (16 - 16)  SpO2: 95% (11 Oct 2024 18:11) (95% - 95%)    Parameters below as of 11 Oct 2024 18:11  Patient On (Oxygen Delivery Method): room air    Gen: AAOx3  CVS: tachycardic  RESP: lungs CTA biaterally  Abd: soft, gravid, nontender  Ext: no tenderness to calf palpation  Bedside US: FHR 156bpm, gross fetal movements visualized       28yo  at 14w4d weeks gestation who was transferred from  for endocrine management, initially presented to  ED for palpitations and elevated HR at home. Patient states she began to have flu-like symptoms Wednesday PM including HA, runny nose, sneezing; was told by PCP to monitor O2 sat and HR. At her telehelath appointment today, PCP noted home HR monitoring to be 120-150s and patient was told to come to the ED. Patient was found to be flu positive at . Patient with known history of hyperthyroidism diagnosed in 2024, currently on methimazole 5mg TID since 24. Due to a miscommunication in outpatient endocrinology office patient had been taking 5mg TID rather than 10mg TID of methimazole. Patient took 5mg methimazole this AM, was given 10mg methimazole at  and propanlol this afternoon; states feeling better since receiving medications around 3PM today.     Patient follows with outpatient endocrinology, [facility] and [facility].    MARTA 25  LMP 24    Pregnancy course complicated by:  1. Hyperthyroidism  2. Underweight (BMI 16.5)  3. Flu positive    OBHx: denies  GYNHx: denies hx of ovarian cysts, fibroids, STIs and abnormal paps  PMH: hyperthyroidism  SurgHx: denies  Meds: PNV,  methimazole 5mg TID  All: NKDA  SocialHx: denies EtOH, tobacco and illicit drug use in pregnancy    Vital Signs Last 24 Hrs  T(C): 37.6 (11 Oct 2024 18:11), Max: 37.6 (11 Oct 2024 18:11)  T(F): 99.7 (11 Oct 2024 18:11), Max: 99.7 (11 Oct 2024 18:11)  HR: 109 (11 Oct 2024 18:11) (109 - 109)  BP: 94/57 (11 Oct 2024 18:11) (94/57 - 94/57)  RR: 16 (11 Oct 2024 18:11) (16 - 16)  SpO2: 95% (11 Oct 2024 18:11) (95% - 95%)    Parameters below as of 11 Oct 2024 18:11  Patient On (Oxygen Delivery Method): room air    Gen: AAOx3  CVS: tachycardic  RESP: lungs CTA bilaterally  Abd: soft, gravid, nontender  Ext: no tenderness to calf palpation  Bedside US: FHR 156bpm, gross fetal movements visualized       28yo  at 14w4d weeks gestation who was transferred from  for endocrine management, initially presented to  ED for palpitations and elevated HR at home. Patient states she began to have flu-like symptoms Wednesday PM including HA, runny nose, sneezing; was told by PCP to monitor O2 sat and HR. At her telehelath appointment today, PCP noted home HR monitoring to be 120-150s and patient was told to come to the ED. Patient was found to be flu positive at . Patient with known history of hyperthyroidism diagnosed in 2024, currently on methimazole 5mg TID since 24. Due to a miscommunication in outpatient endocrinology office patient had been taking 5mg TID rather than 10mg TID of methimazole. Patient took 5mg methimazole this AM, was given 10mg methimazole at  and propanlol this afternoon; states feeling better since receiving medications around 3PM today.     Patient follows with outpatient endocrinology, [facility] and [facility].    MARTA 25  LMP 24    Pregnancy course complicated by:  1. Hyperthyroidism  2. Underweight (BMI 16.5)    OBHx: denies  GYNHx: denies hx of ovarian cysts, fibroids, STIs and abnormal paps  PMH: hyperthyroidism  SurgHx: denies  Meds: PNV,  methimazole 5mg TID  All: NKDA  SocialHx: denies EtOH, tobacco and illicit drug use in pregnancy    Vital Signs Last 24 Hrs  T(C): 37.6 (11 Oct 2024 18:11), Max: 37.6 (11 Oct 2024 18:11)  T(F): 99.7 (11 Oct 2024 18:11), Max: 99.7 (11 Oct 2024 18:11)  HR: 109 (11 Oct 2024 18:11) (109 - 109)  BP: 94/57 (11 Oct 2024 18:11) (94/57 - 94/57)  RR: 16 (11 Oct 2024 18:11) (16 - 16)  SpO2: 95% (11 Oct 2024 18:11) (95% - 95%)    Parameters below as of 11 Oct 2024 18:11  Patient On (Oxygen Delivery Method): room air    Gen: AAOx3  CVS: tachycardic  RESP: lungs CTA bilaterally  Abd: soft, gravid, nontender  Ext: no tenderness to calf palpation  Bedside US: FHR 156bpm, gross fetal movements visualized

## 2024-10-11 NOTE — CONSULT NOTE ADULT - PROBLEM SELECTOR RECOMMENDATION 9
Patient transferred from  for endocrine management, initially presented to  ED for palpitations and elevated HR at home. Patient with known history of hyperthyroidism diagnosed in 8/2024, currently on methimazole 5mg TID since 9/17/24. Patient took 5mg methimazole this AM, was given 10mg methimazole at  and propanlol this afternoon.  - Continue with methimazole 10mg TID  - Propanolol PRN for symptoms  - Recommend endocrinology consultation Patient transferred from  for endocrine management, initially presented to  ED for palpitations and elevated HR at home. Patient with known history of hyperthyroidism diagnosed in 8/2024, currently on methimazole 5mg TID since 9/17/24. Patient took 5mg methimazole this AM, was given 10mg methimazole at  and propanolol this afternoon.    Recommend continuation of methimazole 10mg TID. Methimazole is preferred in second trimester of pregnancy, however PTU can be considered as an alternative. May use propanolol and Zofran PRN for symptomatic management. Recommend endocrinology consultation. Patient transferred from  for endocrine management, initially presented to  ED for palpitations and elevated HR at home. Patient with known history of hyperthyroidism diagnosed in 8/2024, currently on methimazole 5mg TID since 9/17/24. Patient took 5mg methimazole this AM, was given 10mg methimazole at  and propanolol this afternoon.    Recommend continuation of methimazole 10mg TID. Methimazole is preferred in second trimester of pregnancy, however PTU can be considered as an alternative if needed for opitmal patient care. May use propanolol and Zofran PRN for symptomatic management. Recommend endocrinology consultation.

## 2024-10-11 NOTE — ED ADULT NURSE NOTE - CHIEF COMPLAINT QUOTE
pt complaining of shortness of breath, elevated hr (135), and flu-like symptoms.  pt is currently 14 weeks pregnant.  SPO2 95% RA, denies fevers.

## 2024-10-11 NOTE — ED PROVIDER NOTE - PROGRESS NOTE DETAILS
[facility] [phone] labs non actionable except tsh low. will d/w obgyn and /or endocrin.  MD DARYL dr acosta [phone] d/w dr acosta, recommends 10mg tid of  methimazole. can also give propranalol for HR.  MD DARYL d/w transfer line. endocrin recommends ED to ED transfer and admit to medicine. endocrin will consult. MD DARYL d/w ob gyn , will consult md tiffanie  d/w icu , recommend tele admit. md tiffanie  d/w hospitalist for admit, not comfortable taking the admit due to lack of endocrin coverage at . MD TIFFANIE labs non actionable,  except tsh low/zero. will d/w obgyn and /or endocrin.  concern for uncontrolled hyper thyroid in setting of pregnancy and thyroid storm. MD DARYL

## 2024-10-11 NOTE — ED ADULT TRIAGE NOTE - CHIEF COMPLAINT QUOTE
transfer from [place] for hyperthyroidism. pregnant 14 weeks. given propranolol pta. sent her for prophylactic management to prevent thyroid storm.

## 2024-10-11 NOTE — ED STATDOCS - PROGRESS NOTE DETAILS
29-year-old female G1, P0 at 14 weeks pregnant history of thyroid disease presents to the emergency department for flulike symptoms body aches sore throat cough congestion heart rates been in the 130s to 140s.  Here patient's heart rate is 140 while speaking to patient will send to main for further workup and evaluation. Marcin Muniz M.D., Attending Physician

## 2024-10-11 NOTE — ED PROVIDER NOTE - PHYSICAL EXAMINATION
General: NAD, well appearing  HEENT: Normocephalic, atraumatic  Neck: No apparent stiffness or JVD  Pulm: Chest wall symmetric and nontender, lungs clear to ascultation   Cardiac: Fast rate and regular rhythm  Abdomen: Nontender and nondistended  Skin: Skin is warm, dry and intact without rashes or lesions.  Neuro: No motor or sensory deficits above reported baseline  MSK: No deformity or tenderness above reported baseline

## 2024-10-11 NOTE — CONSULT NOTE ADULT - PROBLEM SELECTOR RECOMMENDATION 3
Per patient, flu positive at . Tylenol PRN for fevers. Encourage fluid intake. Discussed use of tamiflu I npregnancy, patient desires treatment. Per patient, flu positive at [facility].     Recommend Tylenol PRN for fevers. Encourage fluid intake. Can consider Tamiflu in pregnancy due to maternal morbidity of flu positive during pregnancy. SCDs fo DVT ppx, can consider subQ heparin if LOS >48hours.

## 2024-10-11 NOTE — ED PROVIDER NOTE - OBJECTIVE STATEMENT
28 yo F with c/o   at 14 weeks pregnant history of thyroid disease that was diagnosed during this pregnancy, taking 5mg methimazole tid, presents to the emergency department for 3 days of  flulike symptoms, body aches, sore throat, cough, congestion, heart rates been in the 130s to 140s.  (She monitors her HR at home 2/2 to the recent dx of hyperthyroid).  No abd pain. +CP and SOB started last PM.  Dec po intake. No n/v/d. NO leg swelling, no hx of dvt/pe

## 2024-10-11 NOTE — ED ADULT NURSE NOTE - OBJECTIVE STATEMENT
29y  at 14w4d by LMP who presented to the ED for elevated heartrate and chest tightness at home. Pt was diagnosed with hyperthyroidism during this pregnancy in end of august and has been taking 5mg methimazole TID, despite endocrinologist wanting 10mg TID in their last visit due to a miscommunication with their office's NP who prescribed 5mg TID. Pt has taken 15mg methimazole total today and propanolol at 3pm. Pt denies N/V/D, abdominal pain, SOB. Pt feels better after being given meds at 3pm. Pt was transferred due to no endocrinologist at [place]. Pt is flu positive at [place] today. No acute distress noted. On continues cardiac monitor.

## 2024-10-11 NOTE — CONSULT NOTE ADULT - ASSESSMENT
28yo  at 14w4d weeks gestation who was transferred from  for endocrine management in the setting of known hyperthyroidism with concerns for thyroid storm.    Discussed with MFM Fellow, Dr. Fernandes.

## 2024-10-11 NOTE — CONSULT NOTE ADULT - PROBLEM SELECTOR RECOMMENDATION 2
Patient received PNC with [facility].   - Continue PNV  - FHR 156bpm on bedside US in ED Patient received PNC with [facility]. Continue PNV. FHR 156bpm on bedside US in ED.

## 2024-10-11 NOTE — ED ADULT NURSE NOTE - NS_ED_NURSE_RECEIVING_FACILITY _ED_ALL_ED
no loss of consciousness, no gait abnormality, no headache, no sensory deficits, and no weakness. [facility]

## 2024-10-11 NOTE — CONSULT NOTE ADULT - ASSESSMENT
Assessment    29y  at 14w4d by LMP who presented to the ED for elevated heartrate at home. GYN consulted for management of hyperthyroid in early pregnancy.     -Tachycardia of 130s; on telemetry. Rest of VSS  -H/H: 12.2/36  -TVUS: +FH in 170s  -Agree with increase of methimazole 10mg BID  -Agree with beta blocker for HR management  -Patient pending transfer to [facility] for endocrine consult, will follow with gyn team at [facility]    Discussed with Dr. Neves.

## 2024-10-11 NOTE — ED PROVIDER NOTE - PHYSICAL EXAMINATION
Constitutional: NAD AOx3  Eyes: PERRL EOMI  Head: Normocephalic atraumatic  Mouth: MMM  Cardiac: tachy rate and rhythm  Resp: Lungs CTAB  GI: Abd s/nd/nt  Neuro: CN2-12 grossly intact, HERRERA x 4  Skin: No visible rashes   Extrem: no edema

## 2024-10-11 NOTE — ED ADULT NURSE NOTE - CHIEF COMPLAINT QUOTE
transfer from [place] for hyperthyroidism. pregnant 14 weeks. given propranolol pta. sent her for prophylactic management to prevent thyroid storm.

## 2024-10-11 NOTE — ED PROVIDER NOTE - NS ED ATTENDING STATEMENT MOD
I have seen and examined this patient and fully participated in the care of this patient as the teaching attending.  The service was shared with the CL.  I reviewed and verified the documentation.

## 2024-10-11 NOTE — ED PROVIDER NOTE - OBJECTIVE STATEMENT
Pt is a 29y  at 14w4d by LMP who presented to the ED for elevated heartrate and chest tightness at home. Pt was diagnosed with hyperthyroidism during this pregnancy in end of august and has been taking 5mg methimazole TID, despite endocrinologist wanting 10mg TID in their last visit due to a miscommunication with their office's NP who prescribed 5mg TID. Pt has taken 15mg methimazole total today and propanolol at 3pm. Pt denies N/V/D, abdominal pain, SOB. Pt feels better after being given meds at 3pm. Pt was transferred due to no endocrinologist at [place]. Pt is flu positive at [place] today. Pt is a 29y  at 14w4d by LMP who presented to the ED for elevated heartrate and chest tightness at home. Pt was diagnosed with hyperthyroidism during this pregnancy in end of august and has been taking 5mg methimazole TID, despite endocrinologist wanting 10mg TID in their last visit due to a miscommunication with their office's NP who prescribed 5mg TID. Pt has taken 15mg methimazole total today and propanolol at 3pm. Pt denies N/V/D, abdominal pain, SOB. Pt feels better after being given meds at 3pm. Pt was transferred due to no endocrinologist at [place].

## 2024-10-11 NOTE — ED PROVIDER NOTE - CARE PLAN
Principal Discharge DX:	Hyperthyroidism  Secondary Diagnosis:	Intrauterine normal pregnancy, unspecified trimester  Secondary Diagnosis:	Sinus tachycardia   1

## 2024-10-11 NOTE — ED ADULT NURSE NOTE - NSFALLUNIVINTERV_ED_ALL_ED
Bed/Stretcher in lowest position, wheels locked, appropriate side rails in place/Call bell, personal items and telephone in reach/Instruct patient to call for assistance before getting out of bed/chair/stretcher/Non-slip footwear applied when patient is off stretcher/[place] to call system/Physically safe environment - no spills, clutter or unnecessary equipment/Purposeful proactive rounding/Room/bathroom lighting operational, light cord in reach

## 2024-10-12 VITALS
HEART RATE: 88 BPM | TEMPERATURE: 98 F | DIASTOLIC BLOOD PRESSURE: 71 MMHG | RESPIRATION RATE: 16 BRPM | OXYGEN SATURATION: 100 % | SYSTOLIC BLOOD PRESSURE: 97 MMHG

## 2024-10-12 LAB
ALBUMIN SERPL ELPH-MCNC: 3.1 G/DL — LOW (ref 3.3–5.2)
ALP SERPL-CCNC: 47 U/L — SIGNIFICANT CHANGE UP (ref 40–120)
ALT FLD-CCNC: 6 U/L — SIGNIFICANT CHANGE UP
ANION GAP SERPL CALC-SCNC: 16 MMOL/L — SIGNIFICANT CHANGE UP (ref 5–17)
AST SERPL-CCNC: 14 U/L — SIGNIFICANT CHANGE UP
BASOPHILS # BLD AUTO: 0.03 K/UL — SIGNIFICANT CHANGE UP (ref 0–0.2)
BASOPHILS NFR BLD AUTO: 0.6 % — SIGNIFICANT CHANGE UP (ref 0–2)
BILIRUB SERPL-MCNC: 0.4 MG/DL — SIGNIFICANT CHANGE UP (ref 0.4–2)
BUN SERPL-MCNC: <3 MG/DL — LOW (ref 8–20)
CALCIUM SERPL-MCNC: 8.5 MG/DL — SIGNIFICANT CHANGE UP (ref 8.4–10.5)
CHLORIDE SERPL-SCNC: 102 MMOL/L — SIGNIFICANT CHANGE UP (ref 96–108)
CO2 SERPL-SCNC: 17 MMOL/L — LOW (ref 22–29)
CREAT SERPL-MCNC: 0.62 MG/DL — SIGNIFICANT CHANGE UP (ref 0.5–1.3)
EGFR: 123 ML/MIN/1.73M2 — SIGNIFICANT CHANGE UP
EOSINOPHIL # BLD AUTO: 0.02 K/UL — SIGNIFICANT CHANGE UP (ref 0–0.5)
EOSINOPHIL NFR BLD AUTO: 0.4 % — SIGNIFICANT CHANGE UP (ref 0–6)
GLUCOSE SERPL-MCNC: 104 MG/DL — HIGH (ref 70–99)
HCG SERPL-ACNC: HIGH MIU/ML
HCT VFR BLD CALC: 30.4 % — LOW (ref 34.5–45)
HGB BLD-MCNC: 10.3 G/DL — LOW (ref 11.5–15.5)
IMM GRANULOCYTES NFR BLD AUTO: 1 % — HIGH (ref 0–0.9)
LYMPHOCYTES # BLD AUTO: 0.53 K/UL — LOW (ref 1–3.3)
LYMPHOCYTES # BLD AUTO: 10.9 % — LOW (ref 13–44)
MCHC RBC-ENTMCNC: 28.8 PG — SIGNIFICANT CHANGE UP (ref 27–34)
MCHC RBC-ENTMCNC: 33.9 GM/DL — SIGNIFICANT CHANGE UP (ref 32–36)
MCV RBC AUTO: 84.9 FL — SIGNIFICANT CHANGE UP (ref 80–100)
MONOCYTES # BLD AUTO: 0.54 K/UL — SIGNIFICANT CHANGE UP (ref 0–0.9)
MONOCYTES NFR BLD AUTO: 11.1 % — SIGNIFICANT CHANGE UP (ref 2–14)
NEUTROPHILS # BLD AUTO: 3.71 K/UL — SIGNIFICANT CHANGE UP (ref 1.8–7.4)
NEUTROPHILS NFR BLD AUTO: 76 % — SIGNIFICANT CHANGE UP (ref 43–77)
PLATELET # BLD AUTO: 201 K/UL — SIGNIFICANT CHANGE UP (ref 150–400)
POTASSIUM SERPL-MCNC: 4 MMOL/L — SIGNIFICANT CHANGE UP (ref 3.5–5.3)
POTASSIUM SERPL-SCNC: 4 MMOL/L — SIGNIFICANT CHANGE UP (ref 3.5–5.3)
PROT SERPL-MCNC: 5.5 G/DL — LOW (ref 6.6–8.7)
RAPID RVP RESULT: DETECTED
RBC # BLD: 3.58 M/UL — LOW (ref 3.8–5.2)
RBC # FLD: 12.6 % — SIGNIFICANT CHANGE UP (ref 10.3–14.5)
RV+EV RNA SPEC QL NAA+PROBE: DETECTED
SARS-COV-2 RNA SPEC QL NAA+PROBE: SIGNIFICANT CHANGE UP
SODIUM SERPL-SCNC: 135 MMOL/L — SIGNIFICANT CHANGE UP (ref 135–145)
T3 SERPL-MCNC: 124 NG/DL — SIGNIFICANT CHANGE UP (ref 80–200)
T4 AB SER-ACNC: 9.9 UG/DL — SIGNIFICANT CHANGE UP (ref 4.5–12)
TSH SERPL-MCNC: <0.1 UIU/ML — LOW (ref 0.27–4.2)
WBC # BLD: 4.88 K/UL — SIGNIFICANT CHANGE UP (ref 3.8–10.5)
WBC # FLD AUTO: 4.88 K/UL — SIGNIFICANT CHANGE UP (ref 3.8–10.5)

## 2024-10-12 RX ORDER — PROPRANOLOL HCL 80 MG
5 CAPSULE, EXTENDED RELEASE 24HR ORAL
Qty: 70 | Refills: 0
Start: 2024-10-12 | End: 2024-10-18

## 2024-10-12 RX ORDER — FERROUS SULFATE 325(65) MG
325 TABLET ORAL DAILY
Refills: 0 | Status: ACTIVE | OUTPATIENT
Start: 2024-10-12 | End: 2025-09-10

## 2024-10-12 RX ORDER — SODIUM CHLORIDE 0.9 % (FLUSH) 0.9 %
1000 SYRINGE (ML) INJECTION ONCE
Refills: 0 | Status: COMPLETED | OUTPATIENT
Start: 2024-10-12 | End: 2024-10-12

## 2024-10-12 RX ORDER — ACETAMINOPHEN 325 MG
650 TABLET ORAL EVERY 6 HOURS
Refills: 0 | Status: ACTIVE | OUTPATIENT
Start: 2024-10-12 | End: 2025-09-10

## 2024-10-12 RX ADMIN — Medication 650 MILLIGRAM(S): at 00:40

## 2024-10-12 RX ADMIN — Medication 1000 MILLILITER(S): at 03:05

## 2024-10-12 RX ADMIN — Medication 325 MILLIGRAM(S): at 16:49

## 2024-10-12 RX ADMIN — Medication 1000 MILLILITER(S): at 02:20

## 2024-10-12 NOTE — ED ADULT NURSE REASSESSMENT NOTE - NS ED NURSE REASSESS COMMENT FT1
Assumed care of pt at this time. Pt A&O x 4 transferred from [facility] for hyperthyroidism in pregnancy. Pt comfortable, denies complaints at this time. Denies pain. [place] footwear. Bed locked and in lowest position. Call bell within reach. Able to make needs known.

## 2024-10-12 NOTE — ED CDU PROVIDER INITIAL DAY NOTE - NSICDXPASTMEDICALHX_GEN_ALL_CORE_FT
PAST MEDICAL HISTORY:  Hyperthyroidism     
clear bilaterally.  Pupils equal, round, and reactive to light.

## 2024-10-12 NOTE — PROGRESS NOTE ADULT - PROBLEM SELECTOR PLAN 3
SCDs fo DVT ppx, can consider subQ heparin if LOS >48hours. SCDs fo DVT ppx, can consider subQ heparin if LOS >48hours.  Enterovirus and RVP positive in ED. Tylenol PRN for fevers. Encourage PO hydration.

## 2024-10-12 NOTE — ED CDU PROVIDER INITIAL DAY NOTE - OBJECTIVE STATEMENT
29 year old female G1Po 14w4D, confirmed IUP presented to ED in [place] for tachycardia, chest tightness. pt dxd with hyperthyroid 08/24, takes medication daily. Pt transferred by [facility] ED to [facility] for endocrine consult. Patient states she is feeling better at this time. Patient endorses nasal congestion no cough.  Labs grossly unremarkable UA was negative D-dimer was negative, +enterovirus positive at [facility]. OB consulted, Endocrine contacted, recommended to hold medication until assessment in AM.   Pt denies N/V/D, abdominal pain, SOB.

## 2024-10-12 NOTE — ED CDU PROVIDER INITIAL DAY NOTE - ATTENDING APP SHARED VISIT CONTRIBUTION OF CARE
"[facility]  Discharge Summary  General Surgery     Andrew Lockwood MRN# [id number]   YOB: 1966 Age: 51 year old     Date of Admission:  2/10/2018  Date of Discharge::  No discharge date for patient encounter.  Admitting Physician:  Stella Escoto MD  Discharge Physician:  Dr. Prince Dowling  Primary Care Physician:        Sharon Rosado          Admission Diagnoses:   Abdominal abscess (H) [K65.1]            Discharge Diagnosis:   There are no discharge diagnoses documented for the most recent discharge.            Procedures:   * No surgery found *  Procedure: RLQ drain sinogram and removal  Date: 2/12/18          Brief History of Illness:   Taken from Admission H&P:  \"Andrew Lockwood is a 51 year old male homeless male with hx of HIV, CMV, CNS toxoplasmosis, DMII, DANISHA who is s/p lap appendectomy on 1/131 for grangreous appendicitis who presents with increased abdominal pain since discharge. He was previously readdmited on 2/4 with an abscess and now s/p IR drainage. He was discharged with 2 weeks of antibiotics which he reports he ran out of 1 week ago. He reports increased pain in the RLQ and at the tube site.\"           Hospital Course:   Pt was admitted to general care floor for pain control. He was started on IV antibiotics since he said he no longer had his PO antibiotics with which he was sent on previous discharge. Since it had been 1 week since his drain placement, he went to IR to have his abscess evaluated, and they removed the drain.      On day of discharge, patient was tolerating a regular diet, voiding independently, having regular bowel movements, tolerating PO non-narcotic pain medications, and was instructed on follow-up plan and concerning signs or questions. He was instructed to follow-up with Dr. Holt or General Surgery clinic, make an appointment with his PCP, and to follow-up with an eye doctor for his blurry vision symptoms he " "reported as having for several years now without proper eye exam.        Day of Discharge Physical Exam:   Blood pressure 111/83, pulse 78, temperature 98.4  F (36.9  C), temperature source Oral, resp. rate 16, weight 75.4 kg (166 lb 4.8 oz), SpO2 99 %.    Gen: AAOx3, NAD  Pulm: Non-labored breathing  Abd: Soft, appropriately tender around previous drain incision site, no guarding or rigidity   Incisions C/D/I  Ext:  Warm and well-perfused         Final Pathology Result:   FINAL DIAGNOSIS:   APPENDIX, APPENDECTOMY on 1/31/18:   - Acute appendicitis with acute serositis     I have personally reviewed all specimens  and/or slides, including the   listed special stains, and used them   with my medical judgement to determine or confirm the final diagnosis.     Electronically signed out by:   Jude Huffman M.D., [facility]            Discharge Instructions and Follow-Up:     Discharge Procedure Orders  Discharge Instructions   Order Comments: DIET: regular diet  No lifting, pushing, or pulling greater than 10-15 pounds x 6 weeks.   Call for fever greater than 101.5, chills, decreased urine output, increased size of incision, red skin around incision, bleeding, shortness of breath, or other concerns.   Transition to ibuprofen or tylenol/acetaminophen for pain control. Do not take tylenol/acetaminophen and acetaminophen containing narcotic (e.g., percocet or vicodin) at the same time.  No driving while taking narcotic pain medications.  In emergencies, call 911   For other Questions or Concerns;   A.) During weekday working hours (Monday through Friday 8am to 4:30pm)   Call [phone] and ask to speak to the Surgery Nurse.   B.) At nights (after 4:30pm), on weekends, or if urgent, call [phone]  and tell the  \"I would like to page the Surgery Resident on call.\"     Adult [facility] Follow-up and recommended labs and tests   Order Comments: Follow up with Dr. Holt or General Surgery staff in Surgery Clinic " in 1 week.     Appointments on [place] and/or [facility] (with [facility] or [facility] provider or service). Call [phone] if you haven't heard regarding these appointments within 7 days of discharge.    Recommend to follow-up with primary care provider within 1 week.    Recommend to follow-up with eye doctor for persistent blurry vision in outpatient setting.              Home Health Care:   Not needed           Discharge Disposition:   Discharged      Condition at discharge: Stable         Consultations:   SOCIAL WORK IP CONSULT  VASCULAR ACCESS CARE ADULT IP CONSULT  INTERVENTIONAL RADIOLOGY ADULT/PEDS IP CONSULT         Imaging Studies:     Results for orders placed or performed during the hospital encounter of 02/10/18   CT Abdomen Pelvis w/o Contrast    Narrative    EXAMINATION: CT ABDOMEN PELVIS W/O CONTRAST, 2/10/2018 8:03 PM    TECHNIQUE: Helical CT images from the lung bases through the symphysis  pubis were obtained without IV contrast.     COMPARISON: 2/5/2018, 2/4/2018, 1/31/2018    HISTORY: Worsening drainage and pain in RLQ    FINDINGS:    Abdomen and pelvis:   Stable position of the percutaneous right lower quadrant surgical  drain with substantially decreased size of the intra-abdominal  abscess. Tiny focal residual fluid collection superior to the drain  containing a few tiny foci of extraluminal gas (series 3, image 242).  The adjacent terminal ileum and cecum appears thickened with  surrounding fat stranding which is stable to mildly improved.    The liver, gallbladder, spleen, adrenal glands, pancreas, and kidneys  appear normal. Prominent prostate. No dilated loops of bowel. Normal  caliber abdominal aorta. Several enlarged right lower quadrant lymph  nodes.    Lung bases:   The heart is not enlarged. No pericardial effusion. Tiny hiatal  hernia. Mild dependent bibasilar atelectasis.    Bones and soft tissues:   Unremarkable appearance of the penile prosthesis, including pelvic  reservoir and  right scrotal components. Small surrounding hydrocele.  Minimal subcutaneous fat stranding with no fluid collection  surrounding the right lower quadrant drain in the anterior abdominal  wall. No acute or worrisome osseous lesions.        Impression    IMPRESSION:   1. Stable position of the percutaneous right lower quadrant drain with  decreased size of the abdominal abscess.  2. There is residual inflammatory changes in the right lower quadrant,  including fat stranding, reactive lymphadenopathy, and thickening of  the terminal ileal wall.  3. Only mild inflammatory changes in the abdominal wall surrounding  the right lower quadrant drain.    I have personally reviewed the examination and initial interpretation  and I agree with the findings.    ALFREDA SHIPLEY MD   XR Chest Port 1 View    Narrative    EXAM: XR CHEST PORT 1 VW  2/11/2018 6:36 AM      HISTORY: Evaluate for tension    COMPARISON: Radiograph 12/11/2017. 2/10/2018    FINDINGS: AP radiograph of the chest. Cardiomediastinal silhouette is  stable. Bibasilar hazy opacities. No pneumothorax or pleural  effusions.       Impression    IMPRESSION:   Hazy bibasilar opacities, likely atelectasis. No pneumothorax.    I have personally reviewed the examination and initial interpretation  and I agree with the findings.    ROSELINE CALDWELL MD   IR Sinogram Injection Diagnostic    Narrative    PRE-PROCEDURE DIAGNOSIS: Right lower quadrant abdominal drain in place    POST-PROCEDURE DIAGNOSIS: Same    PROCEDURE: Contrast injection and fluoroscopic imaging through  existing drain     Impression    IMPRESSION: Completed sinogram through existing drain demonstrating no  cavity, no identified fistula. Drain removed.     PLAN: Drain removed. Patient tolerated the procedure. Return to IR if  new drain placement is required    ----------    CLINICAL HISTORY: Patient with history of abscess, drain place right  lower quadrant following appendectomy. Today patient reports  little  output, and denies fevers, chills, or significant pain.     PERFORMED BY: Ash Vasquez PA-C    MEDICATIONS: No intravenous sedation was administered. A 10:1 volume  mixture of 1% lidocaine without epinephrine buffered with 8.4%  bicarbonate solution was available for local anesthesia.    NURSING: The patient was placed on continuous vital signs monitoring.  Vital signs were monitored by nursing staff under my supervision.    FLUOROSCOPY TIME: Less than 1 minutes.    CONTRAST AMOUNT: 2 mL    DESCRIPTION:  images were obtained documenting current catheter  position. No fluid was aspirated from the fluid collection.  Fluoroscopic evaluation during injection of 5 mL of contrast/saline  mixture revealed the drain with little to no fluid collection  surrounding the drain, no fistulous connection. The contrast was  aspirated. The drain removed without difficulty.    COMPLICATIONS: No immediate concerns, the patient remained stable  throughout the procedure and tolerated it well.    ESTIMATED BLOOD LOSS: Minimal    SPECIMENS: None    ASH VASQUEZ PA-C              Medications Prior to Admission:     Prescriptions Prior to Admission   Medication Sig Dispense Refill Last Dose     oxyCODONE IR (ROXICODONE) 5 MG tablet Take 1-2 tablets (5-10 mg) by mouth every 4 hours as needed for other (pain control or improvement in physical function. Hold dose for analgesic side effects.) 10 tablet 0 Unknown at Unknown time     [DISCONTINUED] cefpodoxime (VANTIN) 200 MG tablet Take 1 tablet (200 mg) by mouth 2 times daily for 14 days 28 tablet 0 2/11/2018 at 2200     acetaminophen (TYLENOL) 325 MG tablet Take 2 tablets (650 mg) by mouth every 6 hours 100 tablet 0 More than a month at Unknown time     polyethylene glycol (MIRALAX/GLYCOLAX) Packet Take 17 g by mouth 2 times daily 24 packet 0 Unknown at Unknown time     sodium chloride, PF, 0.9% PF flush Flush abdominal drain three times per day as instructed. 420 mL 0 Unknown  at Unknown time     senna-docusate (SENOKOT-S;PERICOLACE) 8.6-50 MG per tablet Take 1 tablet by mouth 2 times daily as needed for constipation 50 tablet 0 Unknown at Unknown time     [DISCONTINUED] metroNIDAZOLE (FLAGYL) 500 MG tablet Take 1 tablet (500 mg) by mouth 2 times daily for 13 days 26 tablet 0 2/10/2018 at 2200     polyethylene glycol (MIRALAX/GLYCOLAX) Packet Take 17 g by mouth daily as needed for constipation 10 packet 0 Unknown at Unknown time     ibuprofen (ADVIL/MOTRIN) 200 MG tablet Take 1-2 tablets (200-400 mg) by mouth every 6 hours as needed for moderate pain 40 tablet 0 Unknown at Unknown time     oxyCODONE IR (ROXICODONE) 5 MG tablet Take 1 tablet (5 mg) by mouth every 4 hours as needed for pain maximum 6 tablet(s) per day 15 tablet 0 Unknown at Unknown time     alum & mag hydroxide-simethicone (MAALOX ADVANCED MAX ST) 400-400-40 MG/5ML SUSP suspension Take 30 mLs by mouth every 4 hours as needed for indigestion 355 mL 0 Unknown at Unknown time     blood glucose monitoring (NO BRAND SPECIFIED) meter device kit Use to test blood sugar 4 times daily or as directed. 1 kit 0 Unknown at Unknown time     blood glucose monitoring (NO BRAND SPECIFIED) test strip 1 strip by In Vitro route 4 times daily (before meals and nightly) Use to test blood sugars 4 times daily or as directed 100 strip 11 Unknown at Unknown time     glipiZIDE (GLUCOTROL) 5 MG tablet Take 0.5-1 tablets (2.5-5 mg) by mouth daily 30 tablet 11 Unknown at Unknown time     gabapentin (NEURONTIN) 300 MG capsule Take 1 capsule (300 mg) by mouth 3 times daily 90 capsule 5 Unknown at Unknown time     GENVOYA [phone] mg tablet Take 1 tablet by mouth daily 30 tablet 5 Unknown at Unknown time     blood glucose monitoring (NO BRAND SPECIFIED) meter device kit Use to test blood sugar four times daily or as directed. 1 kit 0 Unknown at Unknown time     blood glucose monitoring (NO BRAND SPECIFIED) test strip Use to test blood sugars four  times daily or as directed 100 each 5 Unknown at Unknown time     blood glucose (NO BRAND SPECIFIED) lancets standard Use to test blood sugar four times daily or as directed. 100 Box 5 Unknown at Unknown time              Discharge Medications:     Current Discharge Medication List      START taking these medications    Details   !! acetaminophen (TYLENOL) 325 MG tablet Take 2 tablets (650 mg) by mouth every 4 hours  Qty: 100 tablet, Refills: 1    Associated Diagnoses: Abdominal abscess (H)       !! - Potential duplicate medications found. Please discuss with provider.      CONTINUE these medications which have NOT CHANGED    Details   !! oxyCODONE IR (ROXICODONE) 5 MG tablet Take 1-2 tablets (5-10 mg) by mouth every 4 hours as needed for other (pain control or improvement in physical function. Hold dose for analgesic side effects.)  Qty: 10 tablet, Refills: 0    Associated Diagnoses: Peritoneal abscess (H)      !! acetaminophen (TYLENOL) 325 MG tablet Take 2 tablets (650 mg) by mouth every 6 hours  Qty: 100 tablet, Refills: 0    Associated Diagnoses: Abdominal abscess (H)      !! polyethylene glycol (MIRALAX/GLYCOLAX) Packet Take 17 g by mouth 2 times daily  Qty: 24 packet, Refills: 0    Associated Diagnoses: Constipation, unspecified constipation type      sodium chloride, PF, 0.9% PF flush Flush abdominal drain three times per day as instructed.  Qty: 420 mL, Refills: 0    Comments: Needs 42 - 10cc flushes for drain irrigation.  Associated Diagnoses: Peritoneal abscess (H)      senna-docusate (SENOKOT-S;PERICOLACE) 8.6-50 MG per tablet Take 1 tablet by mouth 2 times daily as needed for constipation  Qty: 50 tablet, Refills: 0    Associated Diagnoses: Constipation, unspecified constipation type      !! polyethylene glycol (MIRALAX/GLYCOLAX) Packet Take 17 g by mouth daily as needed for constipation  Qty: 10 packet, Refills: 0    Associated Diagnoses: S/P appendectomy      ibuprofen (ADVIL/MOTRIN) 200 MG tablet  Take 1-2 tablets (200-400 mg) by mouth every 6 hours as needed for moderate pain  Qty: 40 tablet, Refills: 0    Associated Diagnoses: S/P appendectomy      !! oxyCODONE IR (ROXICODONE) 5 MG tablet Take 1 tablet (5 mg) by mouth every 4 hours as needed for pain maximum 6 tablet(s) per day  Qty: 15 tablet, Refills: 0    Associated Diagnoses: Acute appendicitis, unspecified acute appendicitis type      alum & mag hydroxide-simethicone (MAALOX ADVANCED MAX ST) 400-400-40 MG/5ML SUSP suspension Take 30 mLs by mouth every 4 hours as needed for indigestion  Qty: 355 mL, Refills: 0      !! blood glucose monitoring (NO BRAND SPECIFIED) meter device kit Use to test blood sugar 4 times daily or as directed.  Qty: 1 kit, Refills: 0    Associated Diagnoses: Type 2 diabetes mellitus with complication, without long-term current use of insulin (H)      !! blood glucose monitoring (NO BRAND SPECIFIED) test strip 1 strip by In Vitro route 4 times daily (before meals and nightly) Use to test blood sugars 4 times daily or as directed  Qty: 100 strip, Refills: 11    Associated Diagnoses: Type 2 diabetes mellitus with complication, without long-term current use of insulin (H)      glipiZIDE (GLUCOTROL) 5 MG tablet Take 0.5-1 tablets (2.5-5 mg) by mouth daily  Qty: 30 tablet, Refills: 11    Associated Diagnoses: Type 2 diabetes mellitus with complication, without long-term current use of insulin (H)      gabapentin (NEURONTIN) 300 MG capsule Take 1 capsule (300 mg) by mouth 3 times daily  Qty: 90 capsule, Refills: 5    Associated Diagnoses: Herpes zoster without complication      GENVOYA [phone] mg tablet Take 1 tablet by mouth daily  Qty: 30 tablet, Refills: 5    Associated Diagnoses: Human immunodeficiency virus (HIV) disease      !! blood glucose monitoring (NO BRAND SPECIFIED) meter device kit Use to test blood sugar four times daily or as directed.  Qty: 1 kit, Refills: 0    Associated Diagnoses: Type 2 diabetes mellitus  without complication, without long-term current use of insulin (H)      !! blood glucose monitoring (NO BRAND SPECIFIED) test strip Use to test blood sugars four times daily or as directed  Qty: 100 each, Refills: 5    Associated Diagnoses: Type 2 diabetes mellitus without complication, without long-term current use of insulin (H)      blood glucose (NO BRAND SPECIFIED) lancets standard Use to test blood sugar four times daily or as directed.  Qty: 100 Box, Refills: 5    Associated Diagnoses: Type 2 diabetes mellitus without complication, without long-term current use of insulin (H)       !! - Potential duplicate medications found. Please discuss with provider.      STOP taking these medications       cefpodoxime (VANTIN) 200 MG tablet Comments:   Reason for Stopping:         metroNIDAZOLE (FLAGYL) 500 MG tablet Comments:   Reason for Stopping:               Pt was seen by team and discussed with Dr. Dowling on day of discharge    Kevin Santana MD  [facility] Surgery Resident         please see same day ed provider note for full assessment

## 2024-10-12 NOTE — PROGRESS NOTE ADULT - SUBJECTIVE AND OBJECTIVE BOX
30yo  at 14w4d weeks gestation who was transferred from  for endocrine management in the setting of known hyperthyroidism; HD#2.    Pregnancy course complicated by:  1. Hyperthyroidism  2. Underweight (BMI 16.5)    SUBJECTIVE:    OBJECTIVE:  Vital Signs Last 24 Hrs  T(C): 36.9 (12 Oct 2024 03:08), Max: 37.6 (11 Oct 2024 18:11)  T(F): 98.4 (12 Oct 2024 03:08), Max: 99.7 (11 Oct 2024 18:11)  HR: 90 (12 Oct 2024 03:08) (90 - 112)  BP: 101/66 (12 Oct 2024 03:08) (94/57 - 105/67)  BP(mean): 79 (11 Oct 2024 23:28) (79 - 79)  RR: 16 (12 Oct 2024 03:08) (16 - 19)  SpO2: 99% (12 Oct 2024 03:08) (95% - 100%)    Parameters below as of 12 Oct 2024 03:08  Patient On (Oxygen Delivery Method): room air    Gen: AAOx3  Abd: soft, gravid, nontender  Ext: no tenderness to calf palpation     28yo  at 14w4d weeks gestation who was transferred from  for endocrine management in the setting of known hyperthyroidism; HD#2.    Pregnancy course complicated by:  1. Hyperthyroidism  2. Underweight (BMI 16.5)    SUBJECTIVE:    OBJECTIVE:  Vital Signs Last 24 Hrs  T(C): 36.9 (12 Oct 2024 03:08), Max: 37.6 (11 Oct 2024 18:11)  T(F): 98.4 (12 Oct 2024 03:08), Max: 99.7 (11 Oct 2024 18:11)  HR: 90 (12 Oct 2024 03:08) (90 - 112)  BP: 101/66 (12 Oct 2024 03:08) (94/57 - 105/67)  BP(mean): 79 (11 Oct 2024 23:28) (79 - 79)  RR: 16 (12 Oct 2024 03:08) (16 - 19)  SpO2: 99% (12 Oct 2024 03:08) (95% - 100%)    Parameters below as of 12 Oct 2024 03:08  Patient On (Oxygen Delivery Method): room air    Gen: AAOx3  Abd: soft, gravid, nontender  Ext: no tenderness to calf palpation    LABS:                        10.3   4.88  )-----------( 201      ( 11 Oct 2024 23:42 )             30.4   10-11    135  |  102  |  <3.0[L]  ----------------------------<  104[H]  4.0   |  17.0[L]  |  0.62    Ca    8.5      11 Oct 2024 23:42    TPro  5.5[L]  /  Alb  3.1[L]  /  TBili  0.4  /  DBili  x   /  AST  14  /  ALT  6   /  AlkPhos  47  10-11   30yo  at 14w4d weeks gestation who was transferred from  for endocrine management in the setting of known hyperthyroidism; HD#2.    SUBJECTIVE:  Patient denies palpitations, CP, sweats, diarrhea.  Denies vaginal bleeding, cramping, leakage of fluids.  Tolerating PO, denies nausea and vomiting.     OBJECTIVE:  Vital Signs Last 24 Hrs  T(C): 36.9 (12 Oct 2024 03:08), Max: 37.6 (11 Oct 2024 18:11)  T(F): 98.4 (12 Oct 2024 03:08), Max: 99.7 (11 Oct 2024 18:11)  HR: 90 (12 Oct 2024 03:08) (90 - 112)  BP: 101/66 (12 Oct 2024 03:08) (94/57 - 105/67)  BP(mean): 79 (11 Oct 2024 23:28) (79 - 79)  RR: 16 (12 Oct 2024 03:08) (16 - 19)  SpO2: 99% (12 Oct 2024 03:08) (95% - 100%)    Parameters below as of 12 Oct 2024 03:08  Patient On (Oxygen Delivery Method): room air    Gen: AAOx3  Abd: soft, gravid, nontender  Ext: no tenderness to calf palpation    LABS:                        10.3   4.88  )-----------( 201      ( 11 Oct 2024 23:42 )             30.4   10-11    135  |  102  |  <3.0[L]  ----------------------------<  104[H]  4.0   |  17.0[L]  |  0.62    Ca    8.5      11 Oct 2024 23:42    TPro  5.5[L]  /  Alb  3.1[L]  /  TBili  0.4  /  DBili  x   /  AST  14  /  ALT  6   /  AlkPhos  47  10-11

## 2024-10-12 NOTE — PROGRESS NOTE ADULT - PROBLEM SELECTOR PLAN 2
Patient received PNC with [facility]. Continue PNV. FHR 156bpm on bedside US in ED 10/12.

## 2024-10-12 NOTE — ED ADULT NURSE REASSESSMENT NOTE - NSFALLUNIVINTERV_ED_ALL_ED
Bed/Stretcher in lowest position, wheels locked, appropriate side rails in place/Call bell, personal items and telephone in reach/Instruct patient to call for assistance before getting out of bed/chair/stretcher/Non-slip footwear applied when patient is off stretcher/[place] to call system/Physically safe environment - no spills, clutter or unnecessary equipment/Purposeful proactive rounding/Room/bathroom lighting operational, light cord in reach

## 2024-10-12 NOTE — CONSULT NOTE ADULT - SUBJECTIVE AND OBJECTIVE BOX
Patient is a 29y old  Female who presents with a chief complaint of increased HR, chest tightness  HPI:   Objective Statement: Pt is a 29y  at 14w4d by LMP who presented to the ED for elevated heartrate and chest tightness at home.   Pt was diagnosed with hyperthyroidism during this pregnancy in end of august and has been taking 5mg methimazole TID,   despite endocrinologist wanting 10mg TID in their last visit due to a miscommunication with their office's NP who prescribed 5mg TID.   Pt has taken 15mg methimazole total yesterday and propanolol.. Pt denied N/V/D, abdominal pain, SOB.     PAST MEDICAL & SURGICAL HISTORY:  Hyperthyroidism        Social History:      FAMILY HISTORY:        Allergies    No Known Allergies    Intolerances        REVIEW OF SYSTEMS:    CONSTITUTIONAL: No fever, weight loss, or fatigue  EYES: No eye pain, visual disturbances, or discharge  ENMT:  No difficulty hearing, tinnitus, vertigo; No sinus or throat pain  NECK: No pain or stiffness  RESPIRATORY: No cough, wheezing, chills or hemoptysis; No shortness of breath  CARDIOVASCULAR: No chest pain, palpitations, dizziness, or leg swelling  GASTROINTESTINAL: No abdominal or epigastric pain. No nausea, vomiting, or hematemesis;   No diarrhea or constipation. No melena or hematochezia.  NEUROLOGICAL: No headaches, memory loss, loss of strength, numbness, or tremors  SKIN: No itching, burning, rashes, or lesions   MUSCULOSKELETAL: No joint pain or swelling; No muscle, back, or extremity pain  PSYCHIATRIC: No depression, anxiety, mood swings, or difficulty sleeping        MEDICATIONS  (STANDING):    MEDICATIONS  (PRN):  acetaminophen     Tablet .. 650 milliGRAM(s) Oral every 6 hours PRN Temp greater or equal to 38C (100.4F), Mild Pain (1 - 3)      Vital Signs Last 24 Hrs  T(C): 36.8 (12 Oct 2024 11:37), Max: 37.6 (11 Oct 2024 18:11)  T(F): 98.3 (12 Oct 2024 11:37), Max: 99.7 (11 Oct 2024 18:11)  HR: 100 (12 Oct 2024 11:37) (90 - 112)  BP: 96/66 (12 Oct 2024 11:37) (94/57 - 105/67)  BP(mean): 79 (11 Oct 2024 23:28) (79 - 79)  RR: 16 (12 Oct 2024 11:37) (16 - 19)  SpO2: 100% (12 Oct 2024 11:37) (95% - 100%)    Parameters below as of 12 Oct 2024 11:37  Patient On (Oxygen Delivery Method): room air          PHYSICAL EXAM:    Constitutional: NAD, well-groomed, well-developed  HEENT: PERRL, no exophalmos  Neck: No LAD,  no thyroid enlargement  Respiratory: CTAB  Cardiovascular: S1 and S2, RRR, no M/G/R  Gastrointestinal: BS+, soft, no organomegaly  Extremities: No peripheral edema, no pedal lesions  Vascular: 2+ peripheral pulses  Neurological: A/O x 3, no focal deficits  Psychiatric: Normal mood, normal affect  Skin: No rashes, no acanthosis        LABS  10-11    135  |  102  |  <3.0[L]  ----------------------------<  104[H]  4.0   |  17.0[L]  |  0.62    Ca    8.5      11 Oct 2024 23:42    TPro  5.5[L]  /  Alb  3.1[L]  /  TBili  0.4  /  DBili  x   /  AST  14  /  ALT  6   /  AlkPhos  47  10-11                          10.3   4.88  )-----------( 201      ( 11 Oct 2024 23:42 )             30.4               Albumin: 3.1 g/dL (10-11-24 @ 23:42)  Alkaline Phosphatase: 47 U/L (10-11-24 @ 23:42)  Aspartate Aminotransferase (AST/SGOT): 14 U/L (10-11-24 @ 23:42)  Alanine Aminotransferase (ALT/SGPT): 6 U/L (10-11-24 @ 23:42)    Triiodothyronine, Total (T3 Total): 137 ng/dL (10-11-24 @ 21:10)    CAPILLARY BLOOD GLUCOSE           Patient is a 29y old  Female who presents with a chief complaint of increased HR, chest tightness  HPI:   Objective Statement: Pt is a 29y  at 14w4d by LMP who presented to the ED for elevated heartrate and chest tightness at home.   Pt was diagnosed with hyperthyroidism during this pregnancy in end of august , per patient she had seen Dr. Gibbons then , her FT4 was high around 2.3,   and was started on  5mg methimazole TID, which she started on 24.   Pt has taken 15mg methimazole total yesterday and propanolol.. Pt denied N/V/D, abdominal pain, SOB.   She denies any past h/o thyroid issues, no f/h of thyroid issues.  Her symptoms were  fatigue and feeling hot, she was not sure if that was due to pregnancy or thyroid.No shakiness, no tremors , no palpitations then.  Then 2 days ago started with low grade fever, mild palpitations, chest tightness, went to  where she was found to have tachycardia, HR in 120s to 140s. was given MMI and propranolol and transferred here.      PAST MEDICAL & SURGICAL HISTORY:  Hyperthyroidism        Social History:non smoker      FAMILY HISTORY:no h/o thyroid issues        Allergies    No Known Allergies    Intolerances        REVIEW OF SYSTEMS:    CONSTITUTIONAL: No fever, weight loss, +fatigue  EYES: No eye pain, visual disturbances, or discharge  ENMT:  No difficulty hearing, tinnitus, vertigo; No sinus or throat pain  NECK: No pain or stiffness  RESPIRATORY: No cough, wheezing, chills or hemoptysis; No shortness of breath  CARDIOVASCULAR: No chest pain, palpitations, dizziness, or leg swelling  GASTROINTESTINAL: No abdominal or epigastric pain. No nausea, vomiting, or hematemesis;   No diarrhea or constipation. No melena or hematochezia.  NEUROLOGICAL: No headaches, memory loss, loss of strength, numbness, or tremors  SKIN: No itching, burning, rashes, or lesions   MUSCULOSKELETAL: No joint pain or swelling; No muscle, back, or extremity pain  PSYCHIATRIC: No depression, anxiety, mood swings, or difficulty sleeping        MEDICATIONS  (STANDING):    MEDICATIONS  (PRN):  acetaminophen     Tablet .. 650 milliGRAM(s) Oral every 6 hours PRN Temp greater or equal to 38C (100.4F), Mild Pain (1 - 3)      Vital Signs Last 24 Hrs  T(C): 36.8 (12 Oct 2024 11:37), Max: 37.6 (11 Oct 2024 18:11)  T(F): 98.3 (12 Oct 2024 11:37), Max: 99.7 (11 Oct 2024 18:11)  HR: 100 (12 Oct 2024 11:37) (90 - 112)  BP: 96/66 (12 Oct 2024 11:37) (94/57 - 105/67)  BP(mean): 79 (11 Oct 2024 23:28) (79 - 79)  RR: 16 (12 Oct 2024 11:37) (16 - 19)  SpO2: 100% (12 Oct 2024 11:37) (95% - 100%)    Parameters below as of 12 Oct 2024 11:37  Patient On (Oxygen Delivery Method): room air          PHYSICAL EXAM:    Constitutional: NAD, well-groomed, well-developed  HEENT: PERRL, no exophalmos  Neck: No LAD,  no thyroid enlargement  Respiratory: CTAB  Cardiovascular: S1 and S2, RRR, no M/G/R  Gastrointestinal: BS+, soft, no organomegaly  Extremities: No peripheral edema, no pedal lesions  Vascular: 2+ peripheral pulses  Neurological: A/O x 3, no focal deficits  Psychiatric: Normal mood, normal affect  Skin: No rashes, no acanthosis        LABS  10-11    135  |  102  |  <3.0[L]  ----------------------------<  104[H]  4.0   |  17.0[L]  |  0.62    Ca    8.5      11 Oct 2024 23:42    TPro  5.5[L]  /  Alb  3.1[L]  /  TBili  0.4  /  DBili  x   /  AST  14  /  ALT  6   /  AlkPhos  47  10-11                          10.3   4.88  )-----------( 201      ( 11 Oct 2024 23:42 )             30.4               Albumin: 3.1 g/dL (10-11-24 @ 23:42)  Alkaline Phosphatase: 47 U/L (10-11-24 @ 23:42)  Aspartate Aminotransferase (AST/SGOT): 14 U/L (10-11-24 @ 23:42)  Alanine Aminotransferase (ALT/SGPT): 6 U/L (10-11-24 @ 23:42)    Triiodothyronine, Total (T3 Total): 137 ng/dL (10-11-24 @ 21:10)    CAPILLARY BLOOD GLUCOSE

## 2024-10-12 NOTE — ED CDU PROVIDER DISPOSITION NOTE - PATIENT PORTAL LINK FT
You can access the FollowMyHealth Patient Portal offered by [facility] by registering at the following website: [email or web address]. By joining [facility]’s FollowMyHealth portal, you will also be able to view your health information using other applications (apps) compatible with our system.

## 2024-10-12 NOTE — ED CDU PROVIDER INITIAL DAY NOTE - CLINICAL SUMMARY MEDICAL DECISION MAKING FREE TEXT BOX
29 year old female G1Po 14w4D, confirmed IUP presented to ED in [place] for tachycardia, chest tightness. pt dxd with hyperthyroid 08/24, takes medication daily. Pt transferred by [facility] ED to [facility] for endocrine consult. Patient states she is feeling better at this time. Patient endorses nasal congestion no cough.  Labs grossly unremarkable UA was negative D-dimer was negative, +enterovirus positive at [facility]. OB consulted, Endocrine contacted, recommended to hold medication until assessment in AM.   Pt denies N/V/D, abdominal pain, SOB.   Placed in observation

## 2024-10-12 NOTE — CONSULT NOTE ADULT - ASSESSMENT
Pt is a 29y  at 14w4d by LMP who presented to the ED for elevated heartrate and chest tightness at home.   Pt was diagnosed with hyperthyroidism during this pregnancy in end of august and has been taking 5mg methimazole TID.  Was transfered from  for management of hyperthyroidism during pregnancy.    Per chart review her   TSH was 0.03  FT4 1.08  TT4  10.7   Total T3   124, 137.      Hyperthyroidism during pregnancy:  Please get TSH, Free T4 and Free T3 today.    Labs reviewed from .  HR normal, not symptomatic, T4 and T3 within normal limits, these levels of TFTs are acceptable  for pregnancy ,   infact goal is to keep T4 and T3 at high normal range, due to risk of hypothyroidism in fetus.  -So will recommend to hold of MMI fro now.  -Treat symptomatically with propranolol.  -Close monitoring OF TFTs as out patient by her endo, and restart MMI incase of worsening of TFTs.    14 weeks gestation of pregnancy.   Patient received PNC with [facility]. Continue PNV.   Follow OB recs       Anemia in pregnancy.    Hgb 10.3 significant for second trimester anemia.   Recommend PO iron  per OB recs   Pt is a 29y  at 14w4d by LMP who presented to the ED for elevated heartrate and chest tightness at home.   Pt was diagnosed with hyperthyroidism during this pregnancy in end of august , per patient she had seen Dr. Gibbons then , her FT4 was high around 2.3,   and was started on  5mg methimazole TID, which she started on 24.  Was transferred from  for management of hyperthyroidism during pregnancy.    Per chart review her   TSH was 0.03  FT4 1.08  TT4  10.7   Total T3   124, 137.      Hyperthyroidism during pregnancy: TFTs improving, probably symptoms due to viral infection.  Please get TSH, Free T4 , Free T3, TSI  today.    Labs reviewed from .  Not symptomatic, T4 and T3 within normal limits, these levels of TFTs are acceptable  for pregnancy ,   infact goal is to keep T4 and T3 at high normal range, due to risk of hypothyroidism in fetus.  -So will recommend to hold of MMI for now.  -Treat symptomatically with propranolol 50 mg bid  -Close monitoring of TFTs as out patient by her endo in 1 week and restart MMI only incase of worsening of TFTs.        14 weeks gestation of pregnancy.   Patient received PNC with [facility]. Continue PNV.   Follow OB recs       Anemia in pregnancy.    Hgb 10.3 significant for second trimester anemia.   Recommend PO iron  per OB recs

## 2024-10-12 NOTE — ED CDU PROVIDER DISPOSITION NOTE - NSFOLLOWUPINSTRUCTIONS_ED_ALL_ED_FT
Please follow up with endocrinologist in 1 week  Take medication as prescribed  Return if symptoms worsen or persist

## 2024-10-12 NOTE — PROGRESS NOTE ADULT - ASSESSMENT
28yo  at 14w4d weeks gestation who was transferred from  for endocrine management in the setting of known hyperthyroidism; HD#2.    Will be discussed with MFM Fellow, Dr. Fernandes

## 2024-10-12 NOTE — ED CDU PROVIDER DISPOSITION NOTE - CLINICAL COURSE
Patient found to be tachycardic during pregnancy, concern for hyperthyroidism during pregnancy.  Intially seen at [facility], transferred for endocrine evaluation.  Also seen by GYN.  TFTs improving, probably symptoms due to viral infection, rhinovirus.  Obtained TSH, Free T4 , Free T3, TSI  today.  RX Propanolol 50mg BID, outpatient f/u with private endocrinologist.

## 2024-10-12 NOTE — PROGRESS NOTE ADULT - PROBLEM SELECTOR PLAN 1
Patient transferred from  for endocrine management, initially presented to  ED for palpitations and elevated HR at home. Patient with known history of hyperthyroidism diagnosed in 8/2024, currently on methimazole 5mg TID since 9/17/24. Patient took 5mg methimazole this AM, was given 10mg methimazole at  and propanolol this afternoon.    Methimazole is preferred in second trimester of pregnancy, however PTU can be considered as an alternative if needed for opitmal patient care. May use propanolol and Zofran PRN for symptomatic management. Defer to endocrinology for methimazole titration.

## 2024-10-13 LAB — T4 FREE SERPL-MCNC: 0.9 NG/DL — SIGNIFICANT CHANGE UP (ref 0.9–1.8)

## 2024-10-15 LAB — TSI ACT/NOR SER: <0.1 IU/L — SIGNIFICANT CHANGE UP (ref 0–0.55)

## 2024-10-16 LAB
CULTURE RESULTS: SIGNIFICANT CHANGE UP
CULTURE RESULTS: SIGNIFICANT CHANGE UP
SPECIMEN SOURCE: SIGNIFICANT CHANGE UP
SPECIMEN SOURCE: SIGNIFICANT CHANGE UP

## 2024-10-17 ENCOUNTER — APPOINTMENT (OUTPATIENT)
Dept: FAMILY MEDICINE | Facility: CLINIC | Age: 29
End: 2024-10-17
Payer: COMMERCIAL

## 2024-10-17 VITALS
OXYGEN SATURATION: 98 % | SYSTOLIC BLOOD PRESSURE: 90 MMHG | TEMPERATURE: 98 F | BODY MASS INDEX: 17.14 KG/M2 | DIASTOLIC BLOOD PRESSURE: 60 MMHG | HEART RATE: 116 BPM | WEIGHT: 85 LBS | HEIGHT: 59 IN

## 2024-10-17 DIAGNOSIS — E05.90 THYROTOXICOSIS, UNSPECIFIED W/OUT THYROTOXIC CRISIS OR STORM: ICD-10-CM

## 2024-10-17 DIAGNOSIS — D64.9 ANEMIA, UNSPECIFIED: ICD-10-CM

## 2024-10-17 DIAGNOSIS — B34.8 OTHER VIRAL INFECTIONS OF UNSPECIFIED SITE: ICD-10-CM

## 2024-10-17 LAB
FERRITIN SERPL-MCNC: 56 NG/ML
IRON SATN MFR SERPL: 25 %
IRON SERPL-MCNC: 100 UG/DL
T3FREE SERPL-MCNC: 2.21 PG/ML
T4 FREE SERPL-MCNC: 0.8 NG/DL
TIBC SERPL-MCNC: 400 UG/DL
TRANSFERRIN SERPL-MCNC: 325 MG/DL
TSH SERPL-ACNC: 0.47 UIU/ML
UIBC SERPL-MCNC: 300 UG/DL

## 2024-10-17 PROCEDURE — 99214 OFFICE O/P EST MOD 30 MIN: CPT

## 2024-10-18 LAB
BASOPHILS # BLD AUTO: 0.02 K/UL
BASOPHILS NFR BLD AUTO: 0.4 %
EOSINOPHIL # BLD AUTO: 0.05 K/UL
EOSINOPHIL NFR BLD AUTO: 1 %
HCT VFR BLD CALC: 34.3 %
HGB BLD-MCNC: 11.5 G/DL
IMM GRANULOCYTES NFR BLD AUTO: 1.7 %
LYMPHOCYTES # BLD AUTO: 1.26 K/UL
LYMPHOCYTES NFR BLD AUTO: 24.2 %
MAN DIFF?: NORMAL
MCHC RBC-ENTMCNC: 28.8 PG
MCHC RBC-ENTMCNC: 33.5 GM/DL
MCV RBC AUTO: 85.8 FL
MONOCYTES # BLD AUTO: 0.44 K/UL
MONOCYTES NFR BLD AUTO: 8.5 %
NEUTROPHILS # BLD AUTO: 3.34 K/UL
NEUTROPHILS NFR BLD AUTO: 64.2 %
PLATELET # BLD AUTO: 253 K/UL
RBC # BLD: 4 M/UL
RBC # BLD: 4 M/UL
RBC # FLD: 12.7 %
RETICS # AUTO: 2.1 %
RETICS AGGREG/RBC NFR: 82.8 K/UL
WBC # FLD AUTO: 5.2 K/UL

## 2024-10-24 ENCOUNTER — APPOINTMENT (OUTPATIENT)
Dept: ENDOCRINOLOGY | Facility: CLINIC | Age: 29
End: 2024-10-24
Payer: COMMERCIAL

## 2024-10-24 VITALS
DIASTOLIC BLOOD PRESSURE: 58 MMHG | SYSTOLIC BLOOD PRESSURE: 90 MMHG | BODY MASS INDEX: 17.14 KG/M2 | HEIGHT: 59 IN | HEART RATE: 115 BPM | WEIGHT: 85 LBS | OXYGEN SATURATION: 99 %

## 2024-10-24 PROCEDURE — 99204 OFFICE O/P NEW MOD 45 MIN: CPT

## 2024-10-24 PROCEDURE — 99214 OFFICE O/P EST MOD 30 MIN: CPT

## 2024-10-24 RX ORDER — PROPRANOLOL HYDROCHLORIDE 10 MG/1
10 TABLET ORAL DAILY
Qty: 30 | Refills: 0 | Status: ACTIVE | COMMUNITY
Start: 2024-10-24 | End: 1900-01-01

## 2024-10-25 ENCOUNTER — APPOINTMENT (OUTPATIENT)
Dept: ANTEPARTUM | Facility: CLINIC | Age: 29
End: 2024-10-25
Payer: COMMERCIAL

## 2024-10-25 ENCOUNTER — LABORATORY RESULT (OUTPATIENT)
Age: 29
End: 2024-10-25

## 2024-10-25 PROCEDURE — 36415 COLL VENOUS BLD VENIPUNCTURE: CPT

## 2024-10-28 PROBLEM — R00.0 TACHYCARDIA: Status: ACTIVE | Noted: 2024-10-28

## 2024-10-28 PROBLEM — J06.9 ACUTE URI: Status: ACTIVE | Noted: 2024-10-28 | Resolved: 2024-11-27

## 2024-10-28 PROBLEM — R00.2 PALPITATIONS: Status: ACTIVE | Noted: 2024-10-28

## 2024-11-18 ENCOUNTER — ASOB RESULT (OUTPATIENT)
Age: 29
End: 2024-11-18

## 2024-11-18 ENCOUNTER — APPOINTMENT (OUTPATIENT)
Dept: ANTEPARTUM | Facility: CLINIC | Age: 29
End: 2024-11-18
Payer: MEDICAID

## 2024-11-18 PROBLEM — E05.90 THYROTOXICOSIS, UNSPECIFIED WITHOUT THYROTOXIC CRISIS OR STORM: Chronic | Status: ACTIVE | Noted: 2024-10-11

## 2024-11-18 PROCEDURE — 76811 OB US DETAILED SNGL FETUS: CPT

## 2024-11-18 PROCEDURE — 76817 TRANSVAGINAL US OBSTETRIC: CPT

## 2024-11-20 ENCOUNTER — APPOINTMENT (OUTPATIENT)
Dept: ENDOCRINOLOGY | Facility: CLINIC | Age: 29
End: 2024-11-20
Payer: COMMERCIAL

## 2024-11-20 DIAGNOSIS — E05.90 ENDOCRINE, NUTRITIONAL AND METABOLIC DISEASES COMPLICATING PREGNANCY, UNSPECIFIED TRIMESTER: ICD-10-CM

## 2024-11-20 DIAGNOSIS — O99.280 ENDOCRINE, NUTRITIONAL AND METABOLIC DISEASES COMPLICATING PREGNANCY, UNSPECIFIED TRIMESTER: ICD-10-CM

## 2024-11-20 DIAGNOSIS — E05.90 THYROTOXICOSIS, UNSPECIFIED W/OUT THYROTOXIC CRISIS OR STORM: ICD-10-CM

## 2024-11-20 PROCEDURE — 99442: CPT

## 2024-11-21 ENCOUNTER — RX RENEWAL (OUTPATIENT)
Age: 29
End: 2024-11-21

## 2024-11-27 ENCOUNTER — APPOINTMENT (OUTPATIENT)
Dept: ORTHOPEDIC SURGERY | Facility: CLINIC | Age: 29
End: 2024-11-27

## 2024-11-27 DIAGNOSIS — G56.00 OTHER SPECIFIED PREGNANCY RELATED CONDITIONS, UNSPECIFIED TRIMESTER: ICD-10-CM

## 2024-11-27 DIAGNOSIS — O26.899 OTHER SPECIFIED PREGNANCY RELATED CONDITIONS, UNSPECIFIED TRIMESTER: ICD-10-CM

## 2024-11-27 PROCEDURE — 99203 OFFICE O/P NEW LOW 30 MIN: CPT

## 2024-12-06 ENCOUNTER — APPOINTMENT (OUTPATIENT)
Dept: ANTEPARTUM | Facility: CLINIC | Age: 29
End: 2024-12-06
Payer: MEDICAID

## 2024-12-06 ENCOUNTER — APPOINTMENT (OUTPATIENT)
Dept: MATERNAL FETAL MEDICINE | Facility: CLINIC | Age: 29
End: 2024-12-06
Payer: MEDICAID

## 2024-12-06 VITALS
WEIGHT: 92 LBS | OXYGEN SATURATION: 98 % | SYSTOLIC BLOOD PRESSURE: 80 MMHG | HEART RATE: 89 BPM | DIASTOLIC BLOOD PRESSURE: 50 MMHG | BODY MASS INDEX: 18.58 KG/M2

## 2024-12-06 DIAGNOSIS — Z3A.22 22 WEEKS GESTATION OF PREGNANCY: ICD-10-CM

## 2024-12-06 DIAGNOSIS — Z3A.09 9 WEEKS GESTATION OF PREGNANCY: ICD-10-CM

## 2024-12-06 DIAGNOSIS — R00.2 PALPITATIONS: ICD-10-CM

## 2024-12-06 DIAGNOSIS — E05.90 THYROTOXICOSIS, UNSPECIFIED W/OUT THYROTOXIC CRISIS OR STORM: ICD-10-CM

## 2024-12-06 PROCEDURE — ZZZZZ: CPT

## 2024-12-06 PROCEDURE — 99214 OFFICE O/P EST MOD 30 MIN: CPT

## 2025-01-08 ENCOUNTER — APPOINTMENT (OUTPATIENT)
Dept: ORTHOPEDIC SURGERY | Facility: CLINIC | Age: 30
End: 2025-01-08
Payer: MEDICAID

## 2025-01-08 DIAGNOSIS — O26.899 OTHER SPECIFIED PREGNANCY RELATED CONDITIONS, UNSPECIFIED TRIMESTER: ICD-10-CM

## 2025-01-08 DIAGNOSIS — M65.4 RADIAL STYLOID TENOSYNOVITIS [DE QUERVAIN]: ICD-10-CM

## 2025-01-08 DIAGNOSIS — G56.00 OTHER SPECIFIED PREGNANCY RELATED CONDITIONS, UNSPECIFIED TRIMESTER: ICD-10-CM

## 2025-01-08 PROCEDURE — 99214 OFFICE O/P EST MOD 30 MIN: CPT

## 2025-01-13 ENCOUNTER — APPOINTMENT (OUTPATIENT)
Dept: ANTEPARTUM | Facility: CLINIC | Age: 30
End: 2025-01-13
Payer: MEDICAID

## 2025-01-13 ENCOUNTER — ASOB RESULT (OUTPATIENT)
Age: 30
End: 2025-01-13

## 2025-01-13 PROCEDURE — 76817 TRANSVAGINAL US OBSTETRIC: CPT

## 2025-01-13 PROCEDURE — 76816 OB US FOLLOW-UP PER FETUS: CPT

## 2025-01-28 ENCOUNTER — APPOINTMENT (OUTPATIENT)
Dept: ENDOCRINOLOGY | Facility: CLINIC | Age: 30
End: 2025-01-28

## 2025-02-11 ENCOUNTER — ASOB RESULT (OUTPATIENT)
Age: 30
End: 2025-02-11

## 2025-02-11 ENCOUNTER — APPOINTMENT (OUTPATIENT)
Dept: ANTEPARTUM | Facility: CLINIC | Age: 30
End: 2025-02-11
Payer: MEDICAID

## 2025-02-11 PROCEDURE — 76819 FETAL BIOPHYS PROFIL W/O NST: CPT

## 2025-02-11 PROCEDURE — 76816 OB US FOLLOW-UP PER FETUS: CPT

## 2025-03-19 ENCOUNTER — APPOINTMENT (OUTPATIENT)
Age: 30
End: 2025-03-19
Payer: MEDICAID

## 2025-03-19 ENCOUNTER — ASOB RESULT (OUTPATIENT)
Age: 30
End: 2025-03-19

## 2025-03-19 PROCEDURE — 76816 OB US FOLLOW-UP PER FETUS: CPT

## 2025-03-19 PROCEDURE — 76819 FETAL BIOPHYS PROFIL W/O NST: CPT

## 2025-03-31 ENCOUNTER — OUTPATIENT (OUTPATIENT)
Dept: OUTPATIENT SERVICES | Facility: HOSPITAL | Age: 30
LOS: 1 days | End: 2025-03-31
Payer: MEDICAID

## 2025-03-31 DIAGNOSIS — Z98.891 HISTORY OF UTERINE SCAR FROM PREVIOUS SURGERY: ICD-10-CM

## 2025-03-31 DIAGNOSIS — Z01.818 ENCOUNTER FOR OTHER PREPROCEDURAL EXAMINATION: ICD-10-CM

## 2025-03-31 LAB
BASOPHILS # BLD AUTO: 0.03 K/UL — SIGNIFICANT CHANGE UP (ref 0–0.2)
BASOPHILS NFR BLD AUTO: 0.7 % — SIGNIFICANT CHANGE UP (ref 0–2)
BLD GP AB SCN SERPL QL: SIGNIFICANT CHANGE UP
EOSINOPHIL # BLD AUTO: 0.04 K/UL — SIGNIFICANT CHANGE UP (ref 0–0.5)
EOSINOPHIL NFR BLD AUTO: 0.9 % — SIGNIFICANT CHANGE UP (ref 0–6)
HCT VFR BLD CALC: 38 % — SIGNIFICANT CHANGE UP (ref 34.5–45)
HGB BLD-MCNC: 13 G/DL — SIGNIFICANT CHANGE UP (ref 11.5–15.5)
IMM GRANULOCYTES # BLD AUTO: 0.08 K/UL — HIGH (ref 0–0.07)
IMM GRANULOCYTES NFR BLD AUTO: 1.8 % — HIGH (ref 0–0.9)
LYMPHOCYTES # BLD AUTO: 1.37 K/UL — SIGNIFICANT CHANGE UP (ref 1–3.3)
LYMPHOCYTES NFR BLD AUTO: 30.6 % — SIGNIFICANT CHANGE UP (ref 13–44)
MCHC RBC-ENTMCNC: 29.3 PG — SIGNIFICANT CHANGE UP (ref 27–34)
MCHC RBC-ENTMCNC: 34.2 G/DL — SIGNIFICANT CHANGE UP (ref 32–36)
MCV RBC AUTO: 85.6 FL — SIGNIFICANT CHANGE UP (ref 80–100)
MONOCYTES # BLD AUTO: 0.4 K/UL — SIGNIFICANT CHANGE UP (ref 0–0.9)
MONOCYTES NFR BLD AUTO: 8.9 % — SIGNIFICANT CHANGE UP (ref 2–14)
NEUTROPHILS # BLD AUTO: 2.56 K/UL — SIGNIFICANT CHANGE UP (ref 1.8–7.4)
NEUTROPHILS NFR BLD AUTO: 57.1 % — SIGNIFICANT CHANGE UP (ref 43–77)
NRBC # BLD AUTO: 0 K/UL — SIGNIFICANT CHANGE UP (ref 0–0)
NRBC # FLD: 0 K/UL — SIGNIFICANT CHANGE UP (ref 0–0)
NRBC BLD AUTO-RTO: 0 /100 WBCS — SIGNIFICANT CHANGE UP (ref 0–0)
PLATELET # BLD AUTO: 178 K/UL — SIGNIFICANT CHANGE UP (ref 150–400)
PMV BLD: 11.4 FL — SIGNIFICANT CHANGE UP (ref 7–13)
RBC # BLD: 4.44 M/UL — SIGNIFICANT CHANGE UP (ref 3.8–5.2)
RBC # FLD: 12.8 % — SIGNIFICANT CHANGE UP (ref 10.3–14.5)
WBC # BLD: 4.48 K/UL — SIGNIFICANT CHANGE UP (ref 3.8–10.5)
WBC # FLD AUTO: 4.48 K/UL — SIGNIFICANT CHANGE UP (ref 3.8–10.5)

## 2025-03-31 PROCEDURE — 86900 BLOOD TYPING SEROLOGIC ABO: CPT

## 2025-03-31 PROCEDURE — 36415 COLL VENOUS BLD VENIPUNCTURE: CPT

## 2025-03-31 PROCEDURE — 86850 RBC ANTIBODY SCREEN: CPT

## 2025-03-31 PROCEDURE — 86901 BLOOD TYPING SEROLOGIC RH(D): CPT

## 2025-03-31 PROCEDURE — 85025 COMPLETE CBC W/AUTO DIFF WBC: CPT

## 2025-04-01 DIAGNOSIS — Z01.818 ENCOUNTER FOR OTHER PREPROCEDURAL EXAMINATION: ICD-10-CM

## 2025-04-01 DIAGNOSIS — Z98.891 HISTORY OF UTERINE SCAR FROM PREVIOUS SURGERY: ICD-10-CM

## 2025-04-02 ENCOUNTER — INPATIENT (INPATIENT)
Facility: HOSPITAL | Age: 30
LOS: 2 days | Discharge: HOME | DRG: 540 | End: 2025-04-05
Attending: OBSTETRICS & GYNECOLOGY | Admitting: OBSTETRICS & GYNECOLOGY
Payer: MEDICAID

## 2025-04-02 VITALS
WEIGHT: 111.99 LBS | OXYGEN SATURATION: 98 % | TEMPERATURE: 99 F | RESPIRATION RATE: 19 BRPM | SYSTOLIC BLOOD PRESSURE: 111 MMHG | HEIGHT: 59 IN | HEART RATE: 77 BPM | DIASTOLIC BLOOD PRESSURE: 74 MMHG

## 2025-04-02 DIAGNOSIS — Z98.891 HISTORY OF UTERINE SCAR FROM PREVIOUS SURGERY: ICD-10-CM

## 2025-04-02 DIAGNOSIS — Z01.818 ENCOUNTER FOR OTHER PREPROCEDURAL EXAMINATION: ICD-10-CM

## 2025-04-02 PROCEDURE — 94760 N-INVAS EAR/PLS OXIMETRY 1: CPT

## 2025-04-02 PROCEDURE — 85025 COMPLETE CBC W/AUTO DIFF WBC: CPT

## 2025-04-02 PROCEDURE — 36415 COLL VENOUS BLD VENIPUNCTURE: CPT

## 2025-04-02 PROCEDURE — 59050 FETAL MONITOR W/REPORT: CPT

## 2025-04-02 PROCEDURE — 90707 MMR VACCINE SC: CPT

## 2025-04-02 PROCEDURE — 85027 COMPLETE CBC AUTOMATED: CPT

## 2025-04-02 RX ORDER — DIPHENHYDRAMINE HCL 12.5MG/5ML
25 ELIXIR ORAL EVERY 6 HOURS
Refills: 0 | Status: DISCONTINUED | OUTPATIENT
Start: 2025-04-02 | End: 2025-04-05

## 2025-04-02 RX ORDER — ENOXAPARIN SODIUM 100 MG/ML
40 INJECTION SUBCUTANEOUS EVERY 24 HOURS
Refills: 0 | Status: DISCONTINUED | OUTPATIENT
Start: 2025-04-02 | End: 2025-04-05

## 2025-04-02 RX ORDER — MODIFIED LANOLIN 100 %
1 CREAM (GRAM) TOPICAL EVERY 6 HOURS
Refills: 0 | Status: DISCONTINUED | OUTPATIENT
Start: 2025-04-02 | End: 2025-04-05

## 2025-04-02 RX ORDER — IBUPROFEN 200 MG
600 TABLET ORAL EVERY 6 HOURS
Refills: 0 | Status: COMPLETED | OUTPATIENT
Start: 2025-04-02 | End: 2026-03-01

## 2025-04-02 RX ORDER — SODIUM CHLORIDE 9 G/1000ML
1000 INJECTION, SOLUTION INTRAVENOUS
Refills: 0 | Status: DISCONTINUED | OUTPATIENT
Start: 2025-04-02 | End: 2025-04-05

## 2025-04-02 RX ORDER — OXYCODONE HYDROCHLORIDE 30 MG/1
5 TABLET ORAL ONCE
Refills: 0 | Status: DISCONTINUED | OUTPATIENT
Start: 2025-04-02 | End: 2025-04-05

## 2025-04-02 RX ORDER — OXYTOCIN-SODIUM CHLORIDE 0.9% IV SOLN 30 UNIT/500ML 30-0.9/5 UT/ML-%
42 SOLUTION INTRAVENOUS
Qty: 30 | Refills: 0 | Status: DISCONTINUED | OUTPATIENT
Start: 2025-04-02 | End: 2025-04-05

## 2025-04-02 RX ORDER — ACETAMINOPHEN 500 MG/5ML
750 LIQUID (ML) ORAL ONCE
Refills: 0 | Status: COMPLETED | OUTPATIENT
Start: 2025-04-02 | End: 2025-04-02

## 2025-04-02 RX ORDER — SIMETHICONE 80 MG
80 TABLET,CHEWABLE ORAL EVERY 4 HOURS
Refills: 0 | Status: DISCONTINUED | OUTPATIENT
Start: 2025-04-02 | End: 2025-04-05

## 2025-04-02 RX ORDER — CITRIC ACID/SODIUM CITRATE 300-500 MG
30 SOLUTION, ORAL ORAL ONCE
Refills: 0 | Status: COMPLETED | OUTPATIENT
Start: 2025-04-02 | End: 2025-04-02

## 2025-04-02 RX ORDER — KETOROLAC TROMETHAMINE 30 MG/ML
30 INJECTION, SOLUTION INTRAMUSCULAR; INTRAVENOUS EVERY 6 HOURS
Refills: 0 | Status: DISCONTINUED | OUTPATIENT
Start: 2025-04-02 | End: 2025-04-04

## 2025-04-02 RX ORDER — MAGNESIUM HYDROXIDE 400 MG/5ML
30 SUSPENSION ORAL
Refills: 0 | Status: DISCONTINUED | OUTPATIENT
Start: 2025-04-02 | End: 2025-04-05

## 2025-04-02 RX ORDER — ACETAMINOPHEN 500 MG/5ML
975 LIQUID (ML) ORAL
Refills: 0 | Status: DISCONTINUED | OUTPATIENT
Start: 2025-04-02 | End: 2025-04-05

## 2025-04-02 RX ORDER — OXYCODONE HYDROCHLORIDE 30 MG/1
5 TABLET ORAL
Refills: 0 | Status: COMPLETED | OUTPATIENT
Start: 2025-04-02 | End: 2025-04-09

## 2025-04-02 RX ADMIN — Medication 300 MILLIGRAM(S): at 16:17

## 2025-04-02 RX ADMIN — KETOROLAC TROMETHAMINE 30 MILLIGRAM(S): 30 INJECTION, SOLUTION INTRAMUSCULAR; INTRAVENOUS at 17:34

## 2025-04-02 RX ADMIN — KETOROLAC TROMETHAMINE 30 MILLIGRAM(S): 30 INJECTION, SOLUTION INTRAMUSCULAR; INTRAVENOUS at 17:35

## 2025-04-02 RX ADMIN — OXYTOCIN-SODIUM CHLORIDE 0.9% IV SOLN 30 UNIT/500ML 42 MILLIUNIT(S)/MIN: 30-0.9/5 SOLUTION at 14:44

## 2025-04-02 RX ADMIN — SODIUM CHLORIDE 200 MILLILITER(S): 9 INJECTION, SOLUTION INTRAVENOUS at 12:45

## 2025-04-02 RX ADMIN — KETOROLAC TROMETHAMINE 30 MILLIGRAM(S): 30 INJECTION, SOLUTION INTRAMUSCULAR; INTRAVENOUS at 23:32

## 2025-04-02 RX ADMIN — Medication 975 MILLIGRAM(S): at 22:00

## 2025-04-02 RX ADMIN — Medication 20 MILLIGRAM(S): at 12:45

## 2025-04-02 RX ADMIN — Medication 30 MILLILITER(S): at 12:29

## 2025-04-02 RX ADMIN — Medication 1 APPLICATION(S): at 12:30

## 2025-04-02 RX ADMIN — Medication 975 MILLIGRAM(S): at 21:17

## 2025-04-03 LAB
BASOPHILS # BLD AUTO: 0.04 K/UL — SIGNIFICANT CHANGE UP (ref 0–0.2)
BASOPHILS NFR BLD AUTO: 0.3 % — SIGNIFICANT CHANGE UP (ref 0–2)
EOSINOPHIL # BLD AUTO: 0.04 K/UL — SIGNIFICANT CHANGE UP (ref 0–0.5)
EOSINOPHIL NFR BLD AUTO: 0.3 % — SIGNIFICANT CHANGE UP (ref 0–6)
HCT VFR BLD CALC: 35.9 % — SIGNIFICANT CHANGE UP (ref 34.5–45)
HGB BLD-MCNC: 12 G/DL — SIGNIFICANT CHANGE UP (ref 11.5–15.5)
IMM GRANULOCYTES # BLD AUTO: 0.08 K/UL — HIGH (ref 0–0.07)
IMM GRANULOCYTES NFR BLD AUTO: 0.7 % — SIGNIFICANT CHANGE UP (ref 0–0.9)
LYMPHOCYTES # BLD AUTO: 2.58 K/UL — SIGNIFICANT CHANGE UP (ref 1–3.3)
LYMPHOCYTES NFR BLD AUTO: 22.1 % — SIGNIFICANT CHANGE UP (ref 13–44)
MCHC RBC-ENTMCNC: 29 PG — SIGNIFICANT CHANGE UP (ref 27–34)
MCHC RBC-ENTMCNC: 33.4 G/DL — SIGNIFICANT CHANGE UP (ref 32–36)
MCV RBC AUTO: 86.7 FL — SIGNIFICANT CHANGE UP (ref 80–100)
MONOCYTES # BLD AUTO: 1.04 K/UL — HIGH (ref 0–0.9)
MONOCYTES NFR BLD AUTO: 8.9 % — SIGNIFICANT CHANGE UP (ref 2–14)
NEUTROPHILS # BLD AUTO: 7.9 K/UL — HIGH (ref 1.8–7.4)
NEUTROPHILS NFR BLD AUTO: 67.7 % — SIGNIFICANT CHANGE UP (ref 43–77)
NRBC # BLD AUTO: 0 K/UL — SIGNIFICANT CHANGE UP (ref 0–0)
NRBC # FLD: 0 K/UL — SIGNIFICANT CHANGE UP (ref 0–0)
NRBC BLD AUTO-RTO: 0 /100 WBCS — SIGNIFICANT CHANGE UP (ref 0–0)
PLATELET # BLD AUTO: 185 K/UL — SIGNIFICANT CHANGE UP (ref 150–400)
PMV BLD: 11.3 FL — SIGNIFICANT CHANGE UP (ref 7–13)
RBC # BLD: 4.14 M/UL — SIGNIFICANT CHANGE UP (ref 3.8–5.2)
RBC # FLD: 12.9 % — SIGNIFICANT CHANGE UP (ref 10.3–14.5)
WBC # BLD: 11.68 K/UL — HIGH (ref 3.8–10.5)
WBC # FLD AUTO: 11.68 K/UL — HIGH (ref 3.8–10.5)

## 2025-04-03 RX ORDER — IBUPROFEN 200 MG
600 TABLET ORAL EVERY 6 HOURS
Refills: 0 | Status: DISCONTINUED | OUTPATIENT
Start: 2025-04-03 | End: 2025-04-05

## 2025-04-03 RX ORDER — OXYCODONE HYDROCHLORIDE 30 MG/1
5 TABLET ORAL
Refills: 0 | Status: DISCONTINUED | OUTPATIENT
Start: 2025-04-03 | End: 2025-04-05

## 2025-04-03 RX ADMIN — KETOROLAC TROMETHAMINE 30 MILLIGRAM(S): 30 INJECTION, SOLUTION INTRAMUSCULAR; INTRAVENOUS at 00:05

## 2025-04-03 RX ADMIN — Medication 600 MILLIGRAM(S): at 23:24

## 2025-04-03 RX ADMIN — Medication 975 MILLIGRAM(S): at 09:12

## 2025-04-03 RX ADMIN — Medication 600 MILLIGRAM(S): at 23:55

## 2025-04-03 RX ADMIN — Medication 975 MILLIGRAM(S): at 15:19

## 2025-04-03 RX ADMIN — Medication 975 MILLIGRAM(S): at 04:17

## 2025-04-03 RX ADMIN — KETOROLAC TROMETHAMINE 30 MILLIGRAM(S): 30 INJECTION, SOLUTION INTRAMUSCULAR; INTRAVENOUS at 12:59

## 2025-04-03 RX ADMIN — Medication 975 MILLIGRAM(S): at 21:25

## 2025-04-03 RX ADMIN — KETOROLAC TROMETHAMINE 30 MILLIGRAM(S): 30 INJECTION, SOLUTION INTRAMUSCULAR; INTRAVENOUS at 06:26

## 2025-04-03 RX ADMIN — Medication 975 MILLIGRAM(S): at 03:07

## 2025-04-03 RX ADMIN — ENOXAPARIN SODIUM 40 MILLIGRAM(S): 100 INJECTION SUBCUTANEOUS at 01:15

## 2025-04-03 RX ADMIN — Medication 975 MILLIGRAM(S): at 21:55

## 2025-04-03 RX ADMIN — Medication 600 MILLIGRAM(S): at 18:21

## 2025-04-04 ENCOUNTER — TRANSCRIPTION ENCOUNTER (OUTPATIENT)
Age: 30
End: 2025-04-04

## 2025-04-04 LAB
HCT VFR BLD CALC: 33.2 % — LOW (ref 34.5–45)
HGB BLD-MCNC: 10.9 G/DL — LOW (ref 11.5–15.5)
MCHC RBC-ENTMCNC: 28.9 PG — SIGNIFICANT CHANGE UP (ref 27–34)
MCHC RBC-ENTMCNC: 32.8 G/DL — SIGNIFICANT CHANGE UP (ref 32–36)
MCV RBC AUTO: 88.1 FL — SIGNIFICANT CHANGE UP (ref 80–100)
NRBC # BLD AUTO: 0 K/UL — SIGNIFICANT CHANGE UP (ref 0–0)
NRBC # FLD: 0 K/UL — SIGNIFICANT CHANGE UP (ref 0–0)
NRBC BLD AUTO-RTO: 0 /100 WBCS — SIGNIFICANT CHANGE UP (ref 0–0)
PLATELET # BLD AUTO: 162 K/UL — SIGNIFICANT CHANGE UP (ref 150–400)
PMV BLD: 10.6 FL — SIGNIFICANT CHANGE UP (ref 7–13)
RBC # BLD: 3.77 M/UL — LOW (ref 3.8–5.2)
RBC # FLD: 13.2 % — SIGNIFICANT CHANGE UP (ref 10.3–14.5)
WBC # BLD: 7.58 K/UL — SIGNIFICANT CHANGE UP (ref 3.8–10.5)
WBC # FLD AUTO: 7.58 K/UL — SIGNIFICANT CHANGE UP (ref 3.8–10.5)

## 2025-04-04 RX ORDER — ACETAMINOPHEN 500 MG/5ML
2 LIQUID (ML) ORAL
Qty: 56 | Refills: 0
Start: 2025-04-04 | End: 2025-04-10

## 2025-04-04 RX ORDER — IBUPROFEN 200 MG
1 TABLET ORAL
Qty: 28 | Refills: 0
Start: 2025-04-04 | End: 2025-04-10

## 2025-04-04 RX ADMIN — Medication 975 MILLIGRAM(S): at 09:03

## 2025-04-04 RX ADMIN — OXYCODONE HYDROCHLORIDE 5 MILLIGRAM(S): 30 TABLET ORAL at 02:29

## 2025-04-04 RX ADMIN — Medication 975 MILLIGRAM(S): at 14:56

## 2025-04-04 RX ADMIN — Medication 975 MILLIGRAM(S): at 02:26

## 2025-04-04 RX ADMIN — Medication 80 MILLIGRAM(S): at 06:12

## 2025-04-04 RX ADMIN — Medication 975 MILLIGRAM(S): at 21:09

## 2025-04-04 RX ADMIN — Medication 600 MILLIGRAM(S): at 17:32

## 2025-04-04 RX ADMIN — Medication 975 MILLIGRAM(S): at 21:45

## 2025-04-04 RX ADMIN — Medication 600 MILLIGRAM(S): at 12:03

## 2025-04-04 RX ADMIN — Medication 600 MILLIGRAM(S): at 13:16

## 2025-04-04 RX ADMIN — Medication 600 MILLIGRAM(S): at 06:12

## 2025-04-04 RX ADMIN — ENOXAPARIN SODIUM 40 MILLIGRAM(S): 100 INJECTION SUBCUTANEOUS at 01:31

## 2025-04-04 RX ADMIN — OXYCODONE HYDROCHLORIDE 5 MILLIGRAM(S): 30 TABLET ORAL at 03:05

## 2025-04-04 RX ADMIN — Medication 975 MILLIGRAM(S): at 09:24

## 2025-04-04 RX ADMIN — Medication 975 MILLIGRAM(S): at 03:05

## 2025-04-05 VITALS
SYSTOLIC BLOOD PRESSURE: 111 MMHG | TEMPERATURE: 98 F | RESPIRATION RATE: 16 BRPM | OXYGEN SATURATION: 97 % | HEART RATE: 82 BPM | DIASTOLIC BLOOD PRESSURE: 74 MMHG

## 2025-04-05 RX ORDER — OXYCODONE HYDROCHLORIDE 30 MG/1
1 TABLET ORAL
Qty: 8 | Refills: 0
Start: 2025-04-05 | End: 2025-04-06

## 2025-04-05 RX ORDER — MEASLES,MUMPS,RUBELLA LIVE VAC 20000/0.5
0.5 VIAL (EA) SUBCUTANEOUS ONCE
Refills: 0 | Status: COMPLETED | OUTPATIENT
Start: 2025-04-05 | End: 2025-04-05

## 2025-04-05 RX ADMIN — Medication 600 MILLIGRAM(S): at 11:32

## 2025-04-05 RX ADMIN — Medication 600 MILLIGRAM(S): at 06:35

## 2025-04-05 RX ADMIN — Medication 80 MILLIGRAM(S): at 00:24

## 2025-04-05 RX ADMIN — Medication 975 MILLIGRAM(S): at 09:40

## 2025-04-05 RX ADMIN — Medication 975 MILLIGRAM(S): at 08:26

## 2025-04-05 RX ADMIN — OXYCODONE HYDROCHLORIDE 5 MILLIGRAM(S): 30 TABLET ORAL at 00:19

## 2025-04-05 RX ADMIN — Medication 600 MILLIGRAM(S): at 00:50

## 2025-04-05 RX ADMIN — Medication 0.5 MILLILITER(S): at 10:30

## 2025-04-05 RX ADMIN — Medication 975 MILLIGRAM(S): at 03:40

## 2025-04-05 RX ADMIN — OXYCODONE HYDROCHLORIDE 5 MILLIGRAM(S): 30 TABLET ORAL at 00:50

## 2025-04-05 RX ADMIN — ENOXAPARIN SODIUM 40 MILLIGRAM(S): 100 INJECTION SUBCUTANEOUS at 00:19

## 2025-04-05 RX ADMIN — Medication 600 MILLIGRAM(S): at 00:19

## 2025-04-05 RX ADMIN — Medication 975 MILLIGRAM(S): at 03:09

## 2025-04-05 RX ADMIN — Medication 600 MILLIGRAM(S): at 12:30

## 2025-04-09 DIAGNOSIS — Z3A.39 39 WEEKS GESTATION OF PREGNANCY: ICD-10-CM

## 2025-04-09 DIAGNOSIS — E05.90 THYROTOXICOSIS, UNSPECIFIED WITHOUT THYROTOXIC CRISIS OR STORM: ICD-10-CM

## 2025-04-09 DIAGNOSIS — Z23 ENCOUNTER FOR IMMUNIZATION: ICD-10-CM

## 2025-08-25 ENCOUNTER — NON-APPOINTMENT (OUTPATIENT)
Age: 30
End: 2025-08-25

## 2025-08-27 ENCOUNTER — APPOINTMENT (OUTPATIENT)
Dept: FAMILY MEDICINE | Facility: CLINIC | Age: 30
End: 2025-08-27
Payer: MEDICAID

## 2025-08-27 VITALS
HEART RATE: 75 BPM | WEIGHT: 97 LBS | BODY MASS INDEX: 19.56 KG/M2 | TEMPERATURE: 98.5 F | OXYGEN SATURATION: 98 % | HEIGHT: 59 IN | DIASTOLIC BLOOD PRESSURE: 60 MMHG | SYSTOLIC BLOOD PRESSURE: 100 MMHG

## 2025-08-27 DIAGNOSIS — M79.671 PAIN IN RIGHT FOOT: ICD-10-CM

## 2025-08-27 DIAGNOSIS — Z00.00 ENCOUNTER FOR GENERAL ADULT MEDICAL EXAMINATION W/OUT ABNORMAL FINDINGS: ICD-10-CM

## 2025-08-27 DIAGNOSIS — M54.2 CERVICALGIA: ICD-10-CM

## 2025-08-27 PROCEDURE — 81003 URINALYSIS AUTO W/O SCOPE: CPT | Mod: QW

## 2025-08-27 PROCEDURE — 92552 PURE TONE AUDIOMETRY AIR: CPT

## 2025-08-27 PROCEDURE — 99395 PREV VISIT EST AGE 18-39: CPT

## 2025-08-27 PROCEDURE — 36415 COLL VENOUS BLD VENIPUNCTURE: CPT

## 2025-08-28 LAB
25(OH)D3 SERPL-MCNC: 33.9 NG/ML
ALBUMIN SERPL ELPH-MCNC: 4.4 G/DL
ALP BLD-CCNC: 86 U/L
ALT SERPL-CCNC: 14 U/L
ANION GAP SERPL CALC-SCNC: 16 MMOL/L
AST SERPL-CCNC: 12 U/L
BASOPHILS # BLD AUTO: 0.05 K/UL
BASOPHILS NFR BLD AUTO: 1 %
BILIRUB SERPL-MCNC: 0.3 MG/DL
BILIRUB UR QL STRIP: NORMAL
BUN SERPL-MCNC: 10 MG/DL
CALCIUM SERPL-MCNC: 9.6 MG/DL
CHLORIDE SERPL-SCNC: 103 MMOL/L
CHOLEST SERPL-MCNC: 185 MG/DL
CLARITY UR: CLEAR
CO2 SERPL-SCNC: 20 MMOL/L
COLLECTION METHOD: NORMAL
CREAT SERPL-MCNC: 0.88 MG/DL
EGFRCR SERPLBLD CKD-EPI 2021: 90 ML/MIN/1.73M2
EOSINOPHIL # BLD AUTO: 0.07 K/UL
EOSINOPHIL NFR BLD AUTO: 1.4 %
ESTIMATED AVERAGE GLUCOSE: 94 MG/DL
GLUCOSE SERPL-MCNC: 85 MG/DL
GLUCOSE UR-MCNC: NORMAL
HBA1C MFR BLD HPLC: 4.9 %
HCG UR QL: 0.2 EU/DL
HCT VFR BLD CALC: 40.6 %
HCV AB SER QL: NONREACTIVE
HCV S/CO RATIO: 0.07 S/CO
HDLC SERPL-MCNC: 73 MG/DL
HGB BLD-MCNC: 13.5 G/DL
HGB UR QL STRIP.AUTO: NORMAL
HIV1+2 AB SPEC QL IA.RAPID: NONREACTIVE
IMM GRANULOCYTES NFR BLD AUTO: 0.2 %
KETONES UR-MCNC: NORMAL
LDLC SERPL-MCNC: 99 MG/DL
LEUKOCYTE ESTERASE UR QL STRIP: NORMAL
LYMPHOCYTES # BLD AUTO: 2.2 K/UL
LYMPHOCYTES NFR BLD AUTO: 42.5 %
MAN DIFF?: NORMAL
MCHC RBC-ENTMCNC: 28.8 PG
MCHC RBC-ENTMCNC: 33.3 G/DL
MCV RBC AUTO: 86.6 FL
MONOCYTES # BLD AUTO: 0.35 K/UL
MONOCYTES NFR BLD AUTO: 6.8 %
NEUTROPHILS # BLD AUTO: 2.5 K/UL
NEUTROPHILS NFR BLD AUTO: 48.1 %
NITRITE UR QL STRIP: NORMAL
NONHDLC SERPL-MCNC: 112 MG/DL
PH UR STRIP: 6.5
PLATELET # BLD AUTO: 249 K/UL
POTASSIUM SERPL-SCNC: 3.7 MMOL/L
PROT SERPL-MCNC: 7 G/DL
PROT UR STRIP-MCNC: NORMAL
RBC # BLD: 4.69 M/UL
RBC # FLD: 12.5 %
SODIUM SERPL-SCNC: 139 MMOL/L
SP GR UR STRIP: 1.01
T3FREE SERPL-MCNC: 3.07 PG/ML
T4 FREE SERPL-MCNC: 1.1 NG/DL
TRIGL SERPL-MCNC: 67 MG/DL
TSH SERPL-ACNC: 1.18 UIU/ML
WBC # FLD AUTO: 5.18 K/UL

## 2025-08-29 ENCOUNTER — APPOINTMENT (OUTPATIENT)
Facility: CLINIC | Age: 30
End: 2025-08-29
Payer: MEDICAID

## 2025-08-29 VITALS — HEIGHT: 59 IN | WEIGHT: 97 LBS | BODY MASS INDEX: 19.56 KG/M2

## 2025-08-29 DIAGNOSIS — M62.838 OTHER MUSCLE SPASM: ICD-10-CM

## 2025-08-29 PROCEDURE — 73030 X-RAY EXAM OF SHOULDER: CPT | Mod: LT

## 2025-08-29 PROCEDURE — 99203 OFFICE O/P NEW LOW 30 MIN: CPT

## 2025-08-29 PROCEDURE — 73010 X-RAY EXAM OF SHOULDER BLADE: CPT | Mod: LT
